# Patient Record
Sex: MALE | Race: WHITE | NOT HISPANIC OR LATINO
[De-identification: names, ages, dates, MRNs, and addresses within clinical notes are randomized per-mention and may not be internally consistent; named-entity substitution may affect disease eponyms.]

---

## 2018-03-26 PROBLEM — Z00.00 ENCOUNTER FOR PREVENTIVE HEALTH EXAMINATION: Status: ACTIVE | Noted: 2018-03-26

## 2018-03-27 ENCOUNTER — APPOINTMENT (OUTPATIENT)
Dept: SURGERY | Facility: CLINIC | Age: 28
End: 2018-03-27
Payer: MEDICAID

## 2018-03-27 VITALS
BODY MASS INDEX: 48.86 KG/M2 | DIASTOLIC BLOOD PRESSURE: 78 MMHG | SYSTOLIC BLOOD PRESSURE: 126 MMHG | HEIGHT: 67.5 IN | WEIGHT: 315 LBS

## 2018-03-27 PROCEDURE — 99204 OFFICE O/P NEW MOD 45 MIN: CPT

## 2018-03-27 PROCEDURE — SI006: CPT

## 2018-04-20 ENCOUNTER — APPOINTMENT (OUTPATIENT)
Dept: SURGERY | Facility: CLINIC | Age: 28
End: 2018-04-20
Payer: MEDICAID

## 2018-04-20 VITALS
WEIGHT: 315 LBS | BODY MASS INDEX: 48.86 KG/M2 | HEIGHT: 67.5 IN | SYSTOLIC BLOOD PRESSURE: 126 MMHG | DIASTOLIC BLOOD PRESSURE: 78 MMHG

## 2018-04-20 DIAGNOSIS — Z82.49 FAMILY HISTORY OF ISCHEMIC HEART DISEASE AND OTHER DISEASES OF THE CIRCULATORY SYSTEM: ICD-10-CM

## 2018-04-20 DIAGNOSIS — Z80.1 FAMILY HISTORY OF MALIGNANT NEOPLASM OF TRACHEA, BRONCHUS AND LUNG: ICD-10-CM

## 2018-04-20 PROCEDURE — 99215 OFFICE O/P EST HI 40 MIN: CPT

## 2018-05-03 ENCOUNTER — APPOINTMENT (OUTPATIENT)
Dept: SURGERY | Facility: CLINIC | Age: 28
End: 2018-05-03
Payer: MEDICAID

## 2018-05-03 VITALS — BODY MASS INDEX: 48.86 KG/M2 | WEIGHT: 315 LBS | HEIGHT: 67.5 IN

## 2018-05-03 PROCEDURE — 97802 MEDICAL NUTRITION INDIV IN: CPT

## 2018-06-07 ENCOUNTER — APPOINTMENT (OUTPATIENT)
Dept: SURGERY | Facility: CLINIC | Age: 28
End: 2018-06-07
Payer: MEDICAID

## 2018-06-07 VITALS — WEIGHT: 315 LBS | BODY MASS INDEX: 48.86 KG/M2 | HEIGHT: 67.5 IN

## 2018-06-07 PROCEDURE — 99212 OFFICE O/P EST SF 10 MIN: CPT

## 2018-07-20 ENCOUNTER — APPOINTMENT (OUTPATIENT)
Dept: SURGERY | Facility: CLINIC | Age: 28
End: 2018-07-20
Payer: MEDICAID

## 2018-07-20 VITALS — BODY MASS INDEX: 48.86 KG/M2 | HEIGHT: 67.5 IN | WEIGHT: 315 LBS

## 2018-07-20 PROCEDURE — 99212 OFFICE O/P EST SF 10 MIN: CPT

## 2018-07-30 ENCOUNTER — OUTPATIENT (OUTPATIENT)
Dept: OUTPATIENT SERVICES | Facility: HOSPITAL | Age: 28
LOS: 1 days | Discharge: HOME | End: 2018-07-30

## 2018-07-30 DIAGNOSIS — E66.01 MORBID (SEVERE) OBESITY DUE TO EXCESS CALORIES: ICD-10-CM

## 2018-07-31 ENCOUNTER — RESULT REVIEW (OUTPATIENT)
Age: 28
End: 2018-07-31

## 2018-08-06 ENCOUNTER — RX CHANGE (OUTPATIENT)
Age: 28
End: 2018-08-06

## 2018-08-08 RX ORDER — ERGOCALCIFEROL 1.25 MG/1
1.25 MG CAPSULE, LIQUID FILLED ORAL
Qty: 4 | Refills: 3 | Status: DISCONTINUED | COMMUNITY
Start: 2018-07-31 | End: 2018-08-08

## 2018-08-14 ENCOUNTER — OUTPATIENT (OUTPATIENT)
Dept: OUTPATIENT SERVICES | Facility: HOSPITAL | Age: 28
LOS: 1 days | Discharge: HOME | End: 2018-08-14

## 2018-08-14 ENCOUNTER — RESULT REVIEW (OUTPATIENT)
Age: 28
End: 2018-08-14

## 2018-08-14 VITALS — SYSTOLIC BLOOD PRESSURE: 164 MMHG | HEART RATE: 80 BPM | DIASTOLIC BLOOD PRESSURE: 74 MMHG | RESPIRATION RATE: 18 BRPM

## 2018-08-14 VITALS
HEART RATE: 100 BPM | RESPIRATION RATE: 20 BRPM | DIASTOLIC BLOOD PRESSURE: 102 MMHG | WEIGHT: 315 LBS | SYSTOLIC BLOOD PRESSURE: 144 MMHG | TEMPERATURE: 99 F | HEIGHT: 67 IN

## 2018-08-16 LAB — SURGICAL PATHOLOGY STUDY: SIGNIFICANT CHANGE UP

## 2018-08-20 DIAGNOSIS — K29.80 DUODENITIS WITHOUT BLEEDING: ICD-10-CM

## 2018-08-20 DIAGNOSIS — K29.00 ACUTE GASTRITIS WITHOUT BLEEDING: ICD-10-CM

## 2018-08-20 DIAGNOSIS — K21.9 GASTRO-ESOPHAGEAL REFLUX DISEASE WITHOUT ESOPHAGITIS: ICD-10-CM

## 2018-08-20 DIAGNOSIS — E66.9 OBESITY, UNSPECIFIED: ICD-10-CM

## 2018-08-22 PROBLEM — E66.9 OBESITY, UNSPECIFIED: Chronic | Status: ACTIVE | Noted: 2018-08-14

## 2018-08-27 ENCOUNTER — OUTPATIENT (OUTPATIENT)
Dept: OUTPATIENT SERVICES | Facility: HOSPITAL | Age: 28
LOS: 1 days | Discharge: HOME | End: 2018-08-27

## 2018-08-27 DIAGNOSIS — R10.10 UPPER ABDOMINAL PAIN, UNSPECIFIED: ICD-10-CM

## 2018-08-28 ENCOUNTER — APPOINTMENT (OUTPATIENT)
Dept: SURGERY | Facility: CLINIC | Age: 28
End: 2018-08-28
Payer: MEDICAID

## 2018-08-28 VITALS — WEIGHT: 315 LBS | BODY MASS INDEX: 48.86 KG/M2 | HEIGHT: 67.5 IN

## 2018-08-28 PROCEDURE — 99212 OFFICE O/P EST SF 10 MIN: CPT

## 2018-08-31 ENCOUNTER — APPOINTMENT (OUTPATIENT)
Dept: PULMONOLOGY | Facility: CLINIC | Age: 28
End: 2018-08-31

## 2018-08-31 ENCOUNTER — OUTPATIENT (OUTPATIENT)
Dept: OUTPATIENT SERVICES | Facility: HOSPITAL | Age: 28
LOS: 1 days | Discharge: HOME | End: 2018-08-31

## 2018-08-31 DIAGNOSIS — G47.33 OBSTRUCTIVE SLEEP APNEA (ADULT) (PEDIATRIC): ICD-10-CM

## 2018-09-04 ENCOUNTER — RESULT REVIEW (OUTPATIENT)
Age: 28
End: 2018-09-04

## 2018-09-04 ENCOUNTER — OTHER (OUTPATIENT)
Age: 28
End: 2018-09-04

## 2018-09-05 ENCOUNTER — OUTPATIENT (OUTPATIENT)
Dept: OUTPATIENT SERVICES | Facility: HOSPITAL | Age: 28
LOS: 1 days | Discharge: HOME | End: 2018-09-05

## 2018-09-05 ENCOUNTER — APPOINTMENT (OUTPATIENT)
Dept: CARDIOLOGY | Facility: CLINIC | Age: 28
End: 2018-09-05

## 2018-09-05 ENCOUNTER — NON-APPOINTMENT (OUTPATIENT)
Age: 28
End: 2018-09-05

## 2018-09-05 VITALS
HEART RATE: 83 BPM | HEIGHT: 67.5 IN | SYSTOLIC BLOOD PRESSURE: 128 MMHG | BODY MASS INDEX: 48.86 KG/M2 | DIASTOLIC BLOOD PRESSURE: 80 MMHG | WEIGHT: 315 LBS

## 2018-09-05 DIAGNOSIS — E66.1 DRUG-INDUCED OBESITY: ICD-10-CM

## 2018-09-05 DIAGNOSIS — R06.02 SHORTNESS OF BREATH: ICD-10-CM

## 2018-09-13 ENCOUNTER — OUTPATIENT (OUTPATIENT)
Dept: OUTPATIENT SERVICES | Facility: HOSPITAL | Age: 28
LOS: 1 days | Discharge: HOME | End: 2018-09-13

## 2018-09-13 DIAGNOSIS — E66.01 MORBID (SEVERE) OBESITY DUE TO EXCESS CALORIES: ICD-10-CM

## 2018-09-13 DIAGNOSIS — R06.02 SHORTNESS OF BREATH: ICD-10-CM

## 2018-09-13 DIAGNOSIS — Z01.818 ENCOUNTER FOR OTHER PREPROCEDURAL EXAMINATION: ICD-10-CM

## 2018-09-17 LAB
25(OH)D3 SERPL-MCNC: 15 NG/ML
CALCIUM SERPL-MCNC: 8.9 MG/DL
PARATHYROID HORMONE INTACT: 56 PG/ML

## 2018-09-25 ENCOUNTER — APPOINTMENT (OUTPATIENT)
Dept: SURGERY | Facility: CLINIC | Age: 28
End: 2018-09-25
Payer: MEDICAID

## 2018-09-25 VITALS — HEIGHT: 67.5 IN | WEIGHT: 315 LBS | BODY MASS INDEX: 48.86 KG/M2

## 2018-09-25 PROCEDURE — 99212 OFFICE O/P EST SF 10 MIN: CPT

## 2018-10-03 ENCOUNTER — APPOINTMENT (OUTPATIENT)
Dept: CARDIOLOGY | Facility: CLINIC | Age: 28
End: 2018-10-03

## 2018-10-03 VITALS
WEIGHT: 315 LBS | SYSTOLIC BLOOD PRESSURE: 132 MMHG | DIASTOLIC BLOOD PRESSURE: 80 MMHG | HEIGHT: 67.5 IN | OXYGEN SATURATION: 97 % | HEART RATE: 99 BPM | BODY MASS INDEX: 48.86 KG/M2

## 2018-10-15 ENCOUNTER — OUTPATIENT (OUTPATIENT)
Dept: OUTPATIENT SERVICES | Facility: HOSPITAL | Age: 28
LOS: 1 days | Discharge: HOME | End: 2018-10-15

## 2018-10-16 DIAGNOSIS — G47.33 OBSTRUCTIVE SLEEP APNEA (ADULT) (PEDIATRIC): ICD-10-CM

## 2018-10-23 ENCOUNTER — APPOINTMENT (OUTPATIENT)
Dept: SURGERY | Facility: CLINIC | Age: 28
End: 2018-10-23
Payer: MEDICAID

## 2018-10-23 VITALS — WEIGHT: 315 LBS | BODY MASS INDEX: 48.86 KG/M2 | HEIGHT: 67.5 IN

## 2018-10-23 PROCEDURE — 99212 OFFICE O/P EST SF 10 MIN: CPT

## 2018-11-02 ENCOUNTER — OUTPATIENT (OUTPATIENT)
Dept: OUTPATIENT SERVICES | Facility: HOSPITAL | Age: 28
LOS: 1 days | Discharge: HOME | End: 2018-11-02

## 2018-11-02 ENCOUNTER — APPOINTMENT (OUTPATIENT)
Dept: PULMONOLOGY | Facility: CLINIC | Age: 28
End: 2018-11-02

## 2018-11-02 VITALS
HEIGHT: 67.5 IN | WEIGHT: 315 LBS | BODY MASS INDEX: 48.86 KG/M2 | HEART RATE: 96 BPM | OXYGEN SATURATION: 96 % | DIASTOLIC BLOOD PRESSURE: 81 MMHG | SYSTOLIC BLOOD PRESSURE: 114 MMHG

## 2018-12-21 ENCOUNTER — APPOINTMENT (OUTPATIENT)
Dept: PULMONOLOGY | Facility: CLINIC | Age: 28
End: 2018-12-21

## 2018-12-21 ENCOUNTER — OUTPATIENT (OUTPATIENT)
Dept: OUTPATIENT SERVICES | Facility: HOSPITAL | Age: 28
LOS: 1 days | Discharge: HOME | End: 2018-12-21

## 2018-12-21 VITALS
HEART RATE: 102 BPM | WEIGHT: 315 LBS | DIASTOLIC BLOOD PRESSURE: 82 MMHG | HEIGHT: 67.5 IN | BODY MASS INDEX: 48.86 KG/M2 | OXYGEN SATURATION: 96 % | SYSTOLIC BLOOD PRESSURE: 122 MMHG

## 2018-12-21 NOTE — ASSESSMENT
[FreeTextEntry1] : 28 year old morbidly obese nonsmoking male with no past medical history or past surgical history presenting for initial visit for bariatric surgery clearance.\par \par #Moderate obstructive sleep apnea \par - Sleep study (novembe 2018): Pt should return to sleep lab for repeat nocturnal polysomnography with CPAP from the start\par - PFTs- WNL\par - CXR- WNL\par -Pt will need to bring his equipment to the hospital to use post-surgery\par -Follow up in 2 months\par \par

## 2018-12-21 NOTE — PHYSICAL EXAM
[No Acute Distress] : no acute distress [Well Nourished] : well nourished [Well Developed] : well developed [Well-Appearing] : well-appearing [Normal Sclera/Conjunctiva] : normal sclera/conjunctiva [PERRL] : pupils equal round and reactive to light [Normal Outer Ear/Nose] : the outer ears and nose were normal in appearance [Normal Oropharynx] : the oropharynx was normal [No JVD] : no jugular venous distention [Supple] : supple [No Respiratory Distress] : no respiratory distress  [Clear to Auscultation] : lungs were clear to auscultation bilaterally [No Accessory Muscle Use] : no accessory muscle use [Normal Rate] : normal rate  [Regular Rhythm] : with a regular rhythm [Normal S1, S2] : normal S1 and S2 [No Murmur] : no murmur heard [No Carotid Bruits] : no carotid bruits [No Extremity Clubbing/Cyanosis] : no extremity clubbing/cyanosis [Soft] : abdomen soft [Non Tender] : non-tender [Non-distended] : non-distended [No HSM] : no HSM [Normal Posterior Cervical Nodes] : no posterior cervical lymphadenopathy [Normal Anterior Cervical Nodes] : no anterior cervical lymphadenopathy [No CVA Tenderness] : no CVA  tenderness [No Spinal Tenderness] : no spinal tenderness [No Joint Swelling] : no joint swelling [Grossly Normal Strength/Tone] : grossly normal strength/tone [No Rash] : no rash [Normal Gait] : normal gait [Coordination Grossly Intact] : coordination grossly intact [No Focal Deficits] : no focal deficits [Deep Tendon Reflexes (DTR)] : deep tendon reflexes were 2+ and symmetric [Normal Affect] : the affect was normal [Normal Insight/Judgement] : insight and judgment were intact [de-identified] :  obese [de-identified] : Decreased breath sounds

## 2018-12-21 NOTE — HISTORY OF PRESENT ILLNESS
[FreeTextEntry1] : Follow up [de-identified] : 28 year old morbidly obese nonsmoking male with no past medical history or past surgical history presenting for follow up on a home sleep study he received. Pt is awaiting clearance before having his bariatric surgery.\par  He still reports snoring at night, waking up around 2-3 times a night, feels unrefreshed in the morning and complains of daytime sleepiness.\par Reports shortness of breath on exertion relieved when resting. Denies any coughing, fevers, chills, SOB, nausea, vomiting, diarrhea or constipation.\par

## 2018-12-21 NOTE — END OF VISIT
[Time Spent: ___ minutes] : I have spent [unfilled] minutes of face to face time with the patient [FreeTextEntry3] : camilla-bipap ordered/////acceptable risk for bariatric sx from a pulm perspective-needs to bring equipment to hosp and use it post-oppt has autopap not bipap

## 2019-02-11 ENCOUNTER — OUTPATIENT (OUTPATIENT)
Dept: OUTPATIENT SERVICES | Facility: HOSPITAL | Age: 29
LOS: 1 days | Discharge: HOME | End: 2019-02-11

## 2019-02-11 VITALS
TEMPERATURE: 98 F | DIASTOLIC BLOOD PRESSURE: 93 MMHG | RESPIRATION RATE: 17 BRPM | WEIGHT: 315 LBS | HEART RATE: 93 BPM | HEIGHT: 67 IN | OXYGEN SATURATION: 97 % | SYSTOLIC BLOOD PRESSURE: 136 MMHG

## 2019-02-11 DIAGNOSIS — Z98.890 OTHER SPECIFIED POSTPROCEDURAL STATES: Chronic | ICD-10-CM

## 2019-02-11 DIAGNOSIS — Z01.818 ENCOUNTER FOR OTHER PREPROCEDURAL EXAMINATION: ICD-10-CM

## 2019-02-11 DIAGNOSIS — E66.01 MORBID (SEVERE) OBESITY DUE TO EXCESS CALORIES: ICD-10-CM

## 2019-02-11 LAB
ALBUMIN SERPL ELPH-MCNC: 4.5 G/DL — SIGNIFICANT CHANGE UP (ref 3.5–5.2)
ALP SERPL-CCNC: 58 U/L — SIGNIFICANT CHANGE UP (ref 30–115)
ALT FLD-CCNC: 36 U/L — SIGNIFICANT CHANGE UP (ref 0–41)
ANION GAP SERPL CALC-SCNC: 21 MMOL/L — HIGH (ref 7–14)
APPEARANCE UR: CLEAR — SIGNIFICANT CHANGE UP
APTT BLD: 34.5 SEC — SIGNIFICANT CHANGE UP (ref 27–39.2)
AST SERPL-CCNC: 19 U/L — SIGNIFICANT CHANGE UP (ref 0–41)
BASOPHILS # BLD AUTO: 0.04 K/UL — SIGNIFICANT CHANGE UP (ref 0–0.2)
BASOPHILS NFR BLD AUTO: 0.6 % — SIGNIFICANT CHANGE UP (ref 0–1)
BILIRUB SERPL-MCNC: 1 MG/DL — SIGNIFICANT CHANGE UP (ref 0.2–1.2)
BILIRUB UR-MCNC: ABNORMAL
BLD GP AB SCN SERPL QL: SIGNIFICANT CHANGE UP
BUN SERPL-MCNC: 15 MG/DL — SIGNIFICANT CHANGE UP (ref 10–20)
CALCIUM SERPL-MCNC: 9.4 MG/DL — SIGNIFICANT CHANGE UP (ref 8.5–10.1)
CHLORIDE SERPL-SCNC: 97 MMOL/L — LOW (ref 98–110)
CO2 SERPL-SCNC: 22 MMOL/L — SIGNIFICANT CHANGE UP (ref 17–32)
COLOR SPEC: SIGNIFICANT CHANGE UP
CREAT SERPL-MCNC: 1 MG/DL — SIGNIFICANT CHANGE UP (ref 0.7–1.5)
DIFF PNL FLD: NEGATIVE — SIGNIFICANT CHANGE UP
EOSINOPHIL # BLD AUTO: 0.08 K/UL — SIGNIFICANT CHANGE UP (ref 0–0.7)
EOSINOPHIL NFR BLD AUTO: 1.2 % — SIGNIFICANT CHANGE UP (ref 0–8)
GLUCOSE SERPL-MCNC: 77 MG/DL — SIGNIFICANT CHANGE UP (ref 70–99)
GLUCOSE UR QL: NEGATIVE MG/DL — SIGNIFICANT CHANGE UP
HCT VFR BLD CALC: 46.1 % — SIGNIFICANT CHANGE UP (ref 42–52)
HGB BLD-MCNC: 15 G/DL — SIGNIFICANT CHANGE UP (ref 14–18)
IMM GRANULOCYTES NFR BLD AUTO: 0.1 % — SIGNIFICANT CHANGE UP (ref 0.1–0.3)
INR BLD: 1.14 RATIO — SIGNIFICANT CHANGE UP (ref 0.65–1.3)
KETONES UR-MCNC: 40
LEUKOCYTE ESTERASE UR-ACNC: NEGATIVE — SIGNIFICANT CHANGE UP
LYMPHOCYTES # BLD AUTO: 1.61 K/UL — SIGNIFICANT CHANGE UP (ref 1.2–3.4)
LYMPHOCYTES # BLD AUTO: 24 % — SIGNIFICANT CHANGE UP (ref 20.5–51.1)
MCHC RBC-ENTMCNC: 27.2 PG — SIGNIFICANT CHANGE UP (ref 27–31)
MCHC RBC-ENTMCNC: 32.5 G/DL — SIGNIFICANT CHANGE UP (ref 32–37)
MCV RBC AUTO: 83.7 FL — SIGNIFICANT CHANGE UP (ref 80–94)
MONOCYTES # BLD AUTO: 0.47 K/UL — SIGNIFICANT CHANGE UP (ref 0.1–0.6)
MONOCYTES NFR BLD AUTO: 7 % — SIGNIFICANT CHANGE UP (ref 1.7–9.3)
NEUTROPHILS # BLD AUTO: 4.5 K/UL — SIGNIFICANT CHANGE UP (ref 1.4–6.5)
NEUTROPHILS NFR BLD AUTO: 67.1 % — SIGNIFICANT CHANGE UP (ref 42.2–75.2)
NITRITE UR-MCNC: NEGATIVE — SIGNIFICANT CHANGE UP
NRBC # BLD: 0 /100 WBCS — SIGNIFICANT CHANGE UP (ref 0–0)
PH UR: 5.5 — SIGNIFICANT CHANGE UP (ref 5–8)
PLATELET # BLD AUTO: 234 K/UL — SIGNIFICANT CHANGE UP (ref 130–400)
POTASSIUM SERPL-MCNC: 4.2 MMOL/L — SIGNIFICANT CHANGE UP (ref 3.5–5)
POTASSIUM SERPL-SCNC: 4.2 MMOL/L — SIGNIFICANT CHANGE UP (ref 3.5–5)
PROT SERPL-MCNC: 7.7 G/DL — SIGNIFICANT CHANGE UP (ref 6–8)
PROT UR-MCNC: NEGATIVE MG/DL — SIGNIFICANT CHANGE UP
PROTHROM AB SERPL-ACNC: 13.1 SEC — HIGH (ref 9.95–12.87)
RBC # BLD: 5.51 M/UL — SIGNIFICANT CHANGE UP (ref 4.7–6.1)
RBC # FLD: 13.5 % — SIGNIFICANT CHANGE UP (ref 11.5–14.5)
SODIUM SERPL-SCNC: 140 MMOL/L — SIGNIFICANT CHANGE UP (ref 135–146)
SP GR SPEC: 1.02 — SIGNIFICANT CHANGE UP (ref 1.01–1.03)
TYPE + AB SCN PNL BLD: SIGNIFICANT CHANGE UP
UROBILINOGEN FLD QL: 1 MG/DL (ref 0.2–0.2)
WBC # BLD: 6.71 K/UL — SIGNIFICANT CHANGE UP (ref 4.8–10.8)
WBC # FLD AUTO: 6.71 K/UL — SIGNIFICANT CHANGE UP (ref 4.8–10.8)

## 2019-02-11 RX ORDER — ERGOCALCIFEROL 1.25 MG/1
0 CAPSULE ORAL
Qty: 0 | Refills: 0 | COMMUNITY

## 2019-02-11 NOTE — H&P PST ADULT - ADDITIONAL PE
MORBIDLY OBESE  ANXIOUS  APPROPRIATE/PLEASANT MORBIDLY OBESE  ANXIOUS  APPROPRIATE/PLEASANT  +1 le ankle edema noted

## 2019-02-11 NOTE — H&P PST ADULT - REASON FOR ADMISSION
Pt denies cp palp uri cough dysuria or sob. ET 1 FOS WITH  SOB AT TIMES . PT denies any open wounds, drainage or rashes.   scheduled for 2/25/19 lap gastric bypass possible open possible INTRAOPERATIVE  endoscopy.  ALL CLEARANCES ON CHART-PT HAS APPT TODAY FOR PMD [facility] .  PT ADVISED TO FOLLOW UP TODAY REGARDING ANY OMEPRAZOLE NEEDED-PT STATES" I RAN OUT. NOT SURE IF I NEED THEM " PT COMPLIANT AND WILL F/U DR DARBY OFFICE.  PRIOR CXR ON CHART 9/2018. PT DENIES ANY RECENT URI COUGHS FEVER

## 2019-02-11 NOTE — H&P PST ADULT - PMH
Anxiety    Depression    Obesity    ANTOLIN on CPAP Anxiety    Depression    Obesity    ANTOLIN on CPAP    Tinnitus

## 2019-02-13 ENCOUNTER — OTHER (OUTPATIENT)
Age: 29
End: 2019-02-13

## 2019-02-20 ENCOUNTER — APPOINTMENT (OUTPATIENT)
Dept: SURGERY | Facility: CLINIC | Age: 29
End: 2019-02-20
Payer: MEDICAID

## 2019-02-20 VITALS
DIASTOLIC BLOOD PRESSURE: 76 MMHG | HEIGHT: 67.5 IN | WEIGHT: 315 LBS | SYSTOLIC BLOOD PRESSURE: 122 MMHG | BODY MASS INDEX: 48.86 KG/M2

## 2019-02-20 DIAGNOSIS — Z01.818 ENCOUNTER FOR OTHER PREPROCEDURAL EXAMINATION: ICD-10-CM

## 2019-02-20 DIAGNOSIS — Z01.810 ENCOUNTER FOR PREPROCEDURAL CARDIOVASCULAR EXAMINATION: ICD-10-CM

## 2019-02-20 PROCEDURE — 99215 OFFICE O/P EST HI 40 MIN: CPT

## 2019-02-20 RX ORDER — OMEPRAZOLE 40 MG/1
40 CAPSULE, DELAYED RELEASE ORAL
Refills: 3 | Status: COMPLETED | COMMUNITY
End: 2019-02-20

## 2019-02-22 ENCOUNTER — OTHER (OUTPATIENT)
Age: 29
End: 2019-02-22

## 2019-02-22 NOTE — ASSESSMENT
[FreeTextEntry1] : GABY CERDA is a 28 year male seen today for Preoperative Bariatric visit. All medications were reviewed with the patient and instructions were given in respect to medications on the day of surgery.  Written instructions provided.\par \par

## 2019-02-22 NOTE — HISTORY OF PRESENT ILLNESS
[de-identified] : He is scheduled for Laparoscopic Gastric Bypass on 2/25/2019. The patient states that he has been overweight for several years and has tried multiple weight loss modalities in the past without any long-term sustainable weight loss. \par

## 2019-02-22 NOTE — REASON FOR VISIT
[Follow-Up Visit] : a follow-up visit for [Morbid Obesity (BMI>40)] : morbid obesity (bmi>40) [FreeTextEntry2] : Patient presents for Preoperative Bariatric visit.

## 2019-02-25 ENCOUNTER — APPOINTMENT (OUTPATIENT)
Dept: SURGERY | Facility: HOSPITAL | Age: 29
End: 2019-02-25

## 2019-02-25 PROBLEM — H93.19 TINNITUS, UNSPECIFIED EAR: Chronic | Status: ACTIVE | Noted: 2019-02-11

## 2019-02-25 PROBLEM — F32.9 MAJOR DEPRESSIVE DISORDER, SINGLE EPISODE, UNSPECIFIED: Chronic | Status: ACTIVE | Noted: 2019-02-11

## 2019-02-25 PROBLEM — G47.33 OBSTRUCTIVE SLEEP APNEA (ADULT) (PEDIATRIC): Chronic | Status: ACTIVE | Noted: 2019-02-11

## 2019-02-25 PROBLEM — F41.9 ANXIETY DISORDER, UNSPECIFIED: Chronic | Status: ACTIVE | Noted: 2019-02-11

## 2019-03-01 ENCOUNTER — OUTPATIENT (OUTPATIENT)
Dept: OUTPATIENT SERVICES | Facility: HOSPITAL | Age: 29
LOS: 1 days | End: 2019-03-01
Payer: MEDICAID

## 2019-03-01 DIAGNOSIS — Z98.890 OTHER SPECIFIED POSTPROCEDURAL STATES: Chronic | ICD-10-CM

## 2019-03-01 PROCEDURE — G9001: CPT

## 2019-03-06 ENCOUNTER — APPOINTMENT (OUTPATIENT)
Dept: SURGERY | Facility: CLINIC | Age: 29
End: 2019-03-06

## 2019-03-11 ENCOUNTER — APPOINTMENT (OUTPATIENT)
Dept: SURGERY | Facility: HOSPITAL | Age: 29
End: 2019-03-11
Payer: MEDICAID

## 2019-03-11 ENCOUNTER — INPATIENT (INPATIENT)
Facility: HOSPITAL | Age: 29
LOS: 1 days | Discharge: HOME | End: 2019-03-13
Attending: SURGERY | Admitting: SURGERY
Payer: MEDICAID

## 2019-03-11 VITALS
SYSTOLIC BLOOD PRESSURE: 139 MMHG | DIASTOLIC BLOOD PRESSURE: 65 MMHG | WEIGHT: 315 LBS | HEART RATE: 121 BPM | TEMPERATURE: 98 F | RESPIRATION RATE: 16 BRPM | HEIGHT: 67 IN

## 2019-03-11 DIAGNOSIS — Z98.890 OTHER SPECIFIED POSTPROCEDURAL STATES: Chronic | ICD-10-CM

## 2019-03-11 LAB
ABO RH CONFIRMATION: SIGNIFICANT CHANGE UP
GLUCOSE BLDC GLUCOMTR-MCNC: 102 MG/DL — HIGH (ref 70–99)
GLUCOSE BLDC GLUCOMTR-MCNC: 110 MG/DL — HIGH (ref 70–99)
GLUCOSE BLDC GLUCOMTR-MCNC: 143 MG/DL — HIGH (ref 70–99)

## 2019-03-11 PROCEDURE — 43644 LAP GASTRIC BYPASS/ROUX-EN-Y: CPT | Mod: 82

## 2019-03-11 PROCEDURE — 43644 LAP GASTRIC BYPASS/ROUX-EN-Y: CPT

## 2019-03-11 RX ORDER — SODIUM CHLORIDE 9 MG/ML
1000 INJECTION, SOLUTION INTRAVENOUS
Qty: 0 | Refills: 0 | Status: DISCONTINUED | OUTPATIENT
Start: 2019-03-11 | End: 2019-03-12

## 2019-03-11 RX ORDER — ONDANSETRON 8 MG/1
4 TABLET, FILM COATED ORAL EVERY 8 HOURS
Qty: 0 | Refills: 0 | Status: DISCONTINUED | OUTPATIENT
Start: 2019-03-11 | End: 2019-03-13

## 2019-03-11 RX ORDER — BUPIVACAINE 13.3 MG/ML
20 INJECTION, SUSPENSION, LIPOSOMAL INFILTRATION ONCE
Qty: 0 | Refills: 0 | Status: DISCONTINUED | OUTPATIENT
Start: 2019-03-11 | End: 2019-03-11

## 2019-03-11 RX ORDER — OXYCODONE AND ACETAMINOPHEN 5; 325 MG/1; MG/1
1 TABLET ORAL ONCE
Qty: 0 | Refills: 0 | Status: DISCONTINUED | OUTPATIENT
Start: 2019-03-11 | End: 2019-03-11

## 2019-03-11 RX ORDER — HYDROMORPHONE HYDROCHLORIDE 2 MG/ML
0.5 INJECTION INTRAMUSCULAR; INTRAVENOUS; SUBCUTANEOUS
Qty: 0 | Refills: 0 | Status: DISCONTINUED | OUTPATIENT
Start: 2019-03-11 | End: 2019-03-11

## 2019-03-11 RX ORDER — HEPARIN SODIUM 5000 [USP'U]/ML
5000 INJECTION INTRAVENOUS; SUBCUTANEOUS ONCE
Qty: 0 | Refills: 0 | Status: COMPLETED | OUTPATIENT
Start: 2019-03-11 | End: 2019-03-11

## 2019-03-11 RX ORDER — PANTOPRAZOLE SODIUM 20 MG/1
40 TABLET, DELAYED RELEASE ORAL EVERY 24 HOURS
Qty: 0 | Refills: 0 | Status: DISCONTINUED | OUTPATIENT
Start: 2019-03-11 | End: 2019-03-13

## 2019-03-11 RX ORDER — MORPHINE SULFATE 50 MG/1
30 CAPSULE, EXTENDED RELEASE ORAL
Qty: 0 | Refills: 0 | Status: DISCONTINUED | OUTPATIENT
Start: 2019-03-11 | End: 2019-03-12

## 2019-03-11 RX ORDER — ASPIRIN/CALCIUM CARB/MAGNESIUM 324 MG
0 TABLET ORAL
Qty: 0 | Refills: 0 | COMMUNITY

## 2019-03-11 RX ORDER — HEPARIN SODIUM 5000 [USP'U]/ML
7500 INJECTION INTRAVENOUS; SUBCUTANEOUS EVERY 8 HOURS
Qty: 0 | Refills: 0 | Status: DISCONTINUED | OUTPATIENT
Start: 2019-03-11 | End: 2019-03-13

## 2019-03-11 RX ORDER — ONDANSETRON 8 MG/1
4 TABLET, FILM COATED ORAL EVERY 6 HOURS
Qty: 0 | Refills: 0 | Status: DISCONTINUED | OUTPATIENT
Start: 2019-03-11 | End: 2019-03-13

## 2019-03-11 RX ORDER — HYDROMORPHONE HYDROCHLORIDE 2 MG/ML
1 INJECTION INTRAMUSCULAR; INTRAVENOUS; SUBCUTANEOUS
Qty: 0 | Refills: 0 | Status: DISCONTINUED | OUTPATIENT
Start: 2019-03-11 | End: 2019-03-12

## 2019-03-11 RX ORDER — HYDROMORPHONE HYDROCHLORIDE 2 MG/ML
1 INJECTION INTRAMUSCULAR; INTRAVENOUS; SUBCUTANEOUS
Qty: 0 | Refills: 0 | Status: DISCONTINUED | OUTPATIENT
Start: 2019-03-11 | End: 2019-03-11

## 2019-03-11 RX ORDER — CHOLECALCIFEROL (VITAMIN D3) 125 MCG
1 CAPSULE ORAL
Qty: 0 | Refills: 0 | COMMUNITY

## 2019-03-11 RX ORDER — NALOXONE HYDROCHLORIDE 4 MG/.1ML
0.1 SPRAY NASAL
Qty: 0 | Refills: 0 | Status: DISCONTINUED | OUTPATIENT
Start: 2019-03-11 | End: 2019-03-13

## 2019-03-11 RX ORDER — SODIUM CHLORIDE 9 MG/ML
1000 INJECTION, SOLUTION INTRAVENOUS
Qty: 0 | Refills: 0 | Status: DISCONTINUED | OUTPATIENT
Start: 2019-03-11 | End: 2019-03-11

## 2019-03-11 RX ORDER — ACETAMINOPHEN 500 MG
1000 TABLET ORAL ONCE
Qty: 0 | Refills: 0 | Status: DISCONTINUED | OUTPATIENT
Start: 2019-03-11 | End: 2019-03-11

## 2019-03-11 RX ORDER — CEFOTETAN DISODIUM 1 G
2 VIAL (EA) INJECTION EVERY 12 HOURS
Qty: 0 | Refills: 0 | Status: DISCONTINUED | OUTPATIENT
Start: 2019-03-11 | End: 2019-03-13

## 2019-03-11 RX ORDER — ONDANSETRON 8 MG/1
4 TABLET, FILM COATED ORAL EVERY 4 HOURS
Qty: 0 | Refills: 0 | Status: DISCONTINUED | OUTPATIENT
Start: 2019-03-11 | End: 2019-03-12

## 2019-03-11 RX ADMIN — HYDROMORPHONE HYDROCHLORIDE 1 MILLIGRAM(S): 2 INJECTION INTRAMUSCULAR; INTRAVENOUS; SUBCUTANEOUS at 12:12

## 2019-03-11 RX ADMIN — HYDROMORPHONE HYDROCHLORIDE 0.5 MILLIGRAM(S): 2 INJECTION INTRAMUSCULAR; INTRAVENOUS; SUBCUTANEOUS at 12:59

## 2019-03-11 RX ADMIN — ONDANSETRON 4 MILLIGRAM(S): 8 TABLET, FILM COATED ORAL at 14:15

## 2019-03-11 RX ADMIN — HEPARIN SODIUM 5000 UNIT(S): 5000 INJECTION INTRAVENOUS; SUBCUTANEOUS at 07:22

## 2019-03-11 RX ADMIN — HYDROMORPHONE HYDROCHLORIDE 1 MILLIGRAM(S): 2 INJECTION INTRAMUSCULAR; INTRAVENOUS; SUBCUTANEOUS at 20:12

## 2019-03-11 RX ADMIN — HYDROMORPHONE HYDROCHLORIDE 0.5 MILLIGRAM(S): 2 INJECTION INTRAMUSCULAR; INTRAVENOUS; SUBCUTANEOUS at 11:49

## 2019-03-11 RX ADMIN — HYDROMORPHONE HYDROCHLORIDE 0.5 MILLIGRAM(S): 2 INJECTION INTRAMUSCULAR; INTRAVENOUS; SUBCUTANEOUS at 13:50

## 2019-03-11 RX ADMIN — HYDROMORPHONE HYDROCHLORIDE 1 MILLIGRAM(S): 2 INJECTION INTRAMUSCULAR; INTRAVENOUS; SUBCUTANEOUS at 20:32

## 2019-03-11 RX ADMIN — HYDROMORPHONE HYDROCHLORIDE 0.5 MILLIGRAM(S): 2 INJECTION INTRAMUSCULAR; INTRAVENOUS; SUBCUTANEOUS at 13:30

## 2019-03-11 RX ADMIN — HYDROMORPHONE HYDROCHLORIDE 1 MILLIGRAM(S): 2 INJECTION INTRAMUSCULAR; INTRAVENOUS; SUBCUTANEOUS at 12:30

## 2019-03-11 RX ADMIN — PANTOPRAZOLE SODIUM 40 MILLIGRAM(S): 20 TABLET, DELAYED RELEASE ORAL at 13:33

## 2019-03-11 RX ADMIN — HYDROMORPHONE HYDROCHLORIDE 0.5 MILLIGRAM(S): 2 INJECTION INTRAMUSCULAR; INTRAVENOUS; SUBCUTANEOUS at 11:58

## 2019-03-11 RX ADMIN — HYDROMORPHONE HYDROCHLORIDE 1 MILLIGRAM(S): 2 INJECTION INTRAMUSCULAR; INTRAVENOUS; SUBCUTANEOUS at 12:18

## 2019-03-11 RX ADMIN — HYDROMORPHONE HYDROCHLORIDE 0.5 MILLIGRAM(S): 2 INJECTION INTRAMUSCULAR; INTRAVENOUS; SUBCUTANEOUS at 14:10

## 2019-03-11 RX ADMIN — HYDROMORPHONE HYDROCHLORIDE 0.5 MILLIGRAM(S): 2 INJECTION INTRAMUSCULAR; INTRAVENOUS; SUBCUTANEOUS at 14:53

## 2019-03-11 RX ADMIN — HYDROMORPHONE HYDROCHLORIDE 0.5 MILLIGRAM(S): 2 INJECTION INTRAMUSCULAR; INTRAVENOUS; SUBCUTANEOUS at 14:12

## 2019-03-11 RX ADMIN — HEPARIN SODIUM 7500 UNIT(S): 5000 INJECTION INTRAVENOUS; SUBCUTANEOUS at 17:56

## 2019-03-11 RX ADMIN — HYDROMORPHONE HYDROCHLORIDE 1 MILLIGRAM(S): 2 INJECTION INTRAMUSCULAR; INTRAVENOUS; SUBCUTANEOUS at 12:00

## 2019-03-11 RX ADMIN — SODIUM CHLORIDE 150 MILLILITER(S): 9 INJECTION, SOLUTION INTRAVENOUS at 11:40

## 2019-03-11 RX ADMIN — ONDANSETRON 4 MILLIGRAM(S): 8 TABLET, FILM COATED ORAL at 22:14

## 2019-03-11 RX ADMIN — Medication 100 GRAM(S): at 19:22

## 2019-03-11 RX ADMIN — MORPHINE SULFATE 30 MILLILITER(S): 50 CAPSULE, EXTENDED RELEASE ORAL at 15:00

## 2019-03-11 RX ADMIN — SODIUM CHLORIDE 125 MILLILITER(S): 9 INJECTION, SOLUTION INTRAVENOUS at 13:00

## 2019-03-11 NOTE — CHART NOTE - NSCHARTNOTEFT_GEN_A_CORE
PACU note      Procedure: Laparoscopic gastric bypass and Linn-en-Y gastroenterostomy with Linn limb 150 cm or less    Post op diagnosis:  Morbid obesity due to excess calories      ____  Intubated  TV:______       Rate: ______      FiO2: ______    _x___  Patent Airway    _x___  Full return of protective reflexes    _x___  Full recovery from anesthesia / back to baseline status    Vitals:  T(C): 37.2 (03-11-19 @ 20:00), Max: 37.2 (03-11-19 @ 16:00)  HR: 96 (03-11-19 @ 20:00) (90 - 121)  BP: 151/81 (03-11-19 @ 20:00) (116/65 - 175/95)  RR: 22 (03-11-19 @ 20:00) (14 - 25)  SpO2: 97% (03-11-19 @ 20:00) (93% - 100%)    Mental Status:  _x___ Awake   _____ Alert   _____ Drowsy   _____ Sedated    Nausea/Vomiting:  _x___  NO       ______Yes,   See Post - Op Orders         Pain Scale (0-10):  6__    Treatment: ___ None    ___ x_ See Post - Op/PCA Orders    Post - Operative Fluids:   __x__ Oral   ____ See Post - Op Orders    Plan: Discharge:   ____Home       _____Floor     ___x __Critical Care    _____  Other:_________________    Comments:  pt reporting pain score in the range of 4-6 will continue PCA morphine and will add two doses of dilaudid for breakthrough pain and will follow up

## 2019-03-11 NOTE — CONSULT NOTE ADULT - SUBJECTIVE AND OBJECTIVE BOX
SICU Consultation Note  ======================================================================================================  28y Male  pmh morbid obesity (BMI 54), ANTOLIN on CPAP, chronic back pain, anxiety. Patient presents today for electric bariatric surgery with Dr. Kruse. Patient is currently s/p lap anish-en-y gastric bypass, extubated and resting in PACU. The procedure was uneventful with no introp events.      Tinnitus  ANTOLIN on CPAP  Depression  Anxiety  Obesity    History of esophagogastroduodenoscopy (EGD)      OR time:   3hours   EBL: 5cc         IV Fluids:1300 IVF       Blood Products:      None         UOP:    150cc with levi    PAST MEDICAL & SURGICAL HISTORY:  Tinnitus  ANTOLIN on CPAP  Depression  Anxiety  Obesity  History of esophagogastroduodenoscopy (EGD)      Home Meds: Home Medications:  aspirin 325 mg oral tablet: prn headache - at timesadvised hold any ASA at this time- (11 Mar 2019 07:47)  Vitamin D3 2000 intl units oral capsule: 1 cap(s) orally once a day (11 Mar 2019 07:47)    Allergies: No Known Allergies    Advanced Directives: Full Code      CURRENT MEDICATIONS:   --------------------------------------------------------------------------------------  Neurologic Medications  HYDROmorphone  Injectable 0.5 milliGRAM(s) IV Push every 10 minutes PRN Moderate Pain (4 - 6)  HYDROmorphone  Injectable 1 milliGRAM(s) IV Push every 10 minutes PRN Severe Pain (7 - 10)  ondansetron Injectable 4 milliGRAM(s) IV Push every 8 hours  ondansetron Injectable 4 milliGRAM(s) IV Push every 4 hours PRN Nausea and/or Vomiting  oxyCODONE    5 mG/acetaminophen 325 mG 1 Tablet(s) Oral once PRN Moderate Pain (4 - 6)    Respiratory Medications    Cardiovascular Medications    Gastrointestinal Medications  lactated ringers. 1000 milliLiter(s) IV Continuous <Continuous>  pantoprazole  Injectable 40 milliGRAM(s) IV Push every 24 hours    Genitourinary Medications    Hematologic/Oncologic Medications  heparin  Injectable 7500 Unit(s) SubCutaneous every 8 hours    Antimicrobial/Immunologic Medications  cefoTEtan  IVPB 2 Gram(s) IV Intermittent every 12 hours    Endocrine/Metabolic Medications    Topical/Other Medications          VITAL SIGNS, INS/OUTS (last 24 hours):    ICU Vital Signs Last 24 Hrs  T(C): 36.6 (11 Mar 2019 11:40), Max: 36.8 (11 Mar 2019 06:26)  T(F): 97.9 (11 Mar 2019 11:40), Max: 98.2 (11 Mar 2019 06:26)  HR: 94 (11 Mar 2019 13:00) (90 - 121)  BP: 134/68 (11 Mar 2019 13:00) (134/68 - 175/95)  BP(mean): 89 (11 Mar 2019 13:00) (89 - 124)  RR: 22 (11 Mar 2019 13:00) (16 - 25)  SpO2: 97% (11 Mar 2019 13:00) (93% - 100%)    I&O's Summary    11 Mar 2019 07:01  -  11 Mar 2019 13:27  --------------------------------------------------------  IN: 180 mL / OUT: 150 mL / NET: 30 mL          Height (cm): 170.18 (03-11-19)  Weight (kg): 163.3 (03-11-19)  BMI (kg/m2): 56.4 (03-11-19)  BSA (m2): 2.6 (03-11-19)    Physical Exam:  ---------------------------------------------------------------------------------------  GCS: 15  Exam: A&Ox3, no focal deficits    RESPIRATORY:  Nasacl cannula    CARDIOVASCULAR:   S1/S2. Sinus tachy    GASTROINTESTINAL:  Abdomen soft, non-tender, non-distended, no wounds or discoloration    MUSCULOSKELETAL:  Extremities warm, pink, well-perfused.  b/l radial pulses, palpable    DERM:  No skin breakdown     :   Exam: Levi catheter in place.       Tubes/Lines/Drains   ----------------------------------------------------------------------------------------------------------  [x] Peripheral IV  [X] Urinary Catheter Levi         Yes                     Date Placed: 03-11      LABS  --------------------------------------------------------------------------------------  2330 Labs      CAPILLARY BLOOD GLUCOSE      POCT Blood Glucose.: 102 mg/dL (11 Mar 2019 06:07)        CT/XRAY/ECHO/TCD/EEG  ----------------------------------------------------------------------------------------------  AM CXR      --------------------------------------------------------------------------------------  Admit Diagnosis: elective bariatric surgery      Critical Care Diagnoses: morbid SICU Consultation Note  ======================================================================================================  28y Male  pmh morbid obesity (BMI 54), ANTOLIN on CPAP, chronic back pain, anxiety. Patient presents today for electric bariatric surgery with Dr. Kruse. Patient is currently s/p lap anish-en-y gastric bypass, extubated and resting in PACU. The procedure was uneventful with no introp events.    OR time:   3hours   EBL: 5cc         IV Fluids:1300 IVF       Blood Products:      None         UOP:    150cc with amita    PAST MEDICAL & SURGICAL HISTORY:  Tinnitus  ANTOLIN on CPAP  Depression  Anxiety  Obesity  History of esophagogastroduodenoscopy (EGD)      Home Meds: Home Medications:  aspirin 325 mg oral tablet: prn headache - at timesadvised hold any ASA at this time- (11 Mar 2019 07:47)  Vitamin D3 2000 intl units oral capsule: 1 cap(s) orally once a day (11 Mar 2019 07:47)    Allergies: No Known Allergies    Advanced Directives: Full Code      CURRENT MEDICATIONS:   --------------------------------------------------------------------------------------  Neurologic Medications  HYDROmorphone  Injectable 0.5 milliGRAM(s) IV Push every 10 minutes PRN Moderate Pain (4 - 6)  HYDROmorphone  Injectable 1 milliGRAM(s) IV Push every 10 minutes PRN Severe Pain (7 - 10)  ondansetron Injectable 4 milliGRAM(s) IV Push every 8 hours  ondansetron Injectable 4 milliGRAM(s) IV Push every 4 hours PRN Nausea and/or Vomiting  oxyCODONE    5 mG/acetaminophen 325 mG 1 Tablet(s) Oral once PRN Moderate Pain (4 - 6)    Respiratory Medications    Cardiovascular Medications    Gastrointestinal Medications  lactated ringers. 1000 milliLiter(s) IV Continuous <Continuous>  pantoprazole  Injectable 40 milliGRAM(s) IV Push every 24 hours    Genitourinary Medications    Hematologic/Oncologic Medications  heparin  Injectable 7500 Unit(s) SubCutaneous every 8 hours    Antimicrobial/Immunologic Medications  cefoTEtan  IVPB 2 Gram(s) IV Intermittent every 12 hours    Endocrine/Metabolic Medications    Topical/Other Medications      VITAL SIGNS, INS/OUTS (last 24 hours):    ICU Vital Signs Last 24 Hrs  T(C): 36.6 (11 Mar 2019 11:40), Max: 36.8 (11 Mar 2019 06:26)  T(F): 97.9 (11 Mar 2019 11:40), Max: 98.2 (11 Mar 2019 06:26)  HR: 94 (11 Mar 2019 13:00) (90 - 121)  BP: 134/68 (11 Mar 2019 13:00) (134/68 - 175/95)  BP(mean): 89 (11 Mar 2019 13:00) (89 - 124)  RR: 22 (11 Mar 2019 13:00) (16 - 25)  SpO2: 97% (11 Mar 2019 13:00) (93% - 100%)    I&O's Summary    11 Mar 2019 07:01  -  11 Mar 2019 13:27  --------------------------------------------------------  IN: 180 mL / OUT: 150 mL / NET: 30 mL          Height (cm): 170.18 (03-11-19)  Weight (kg): 163.3 (03-11-19)  BMI (kg/m2): 56.4 (03-11-19)  BSA (m2): 2.6 (03-11-19)    Physical Exam:  ---------------------------------------------------------------------------------------  GCS: 15  Exam: A&Ox3, no focal deficits    RESPIRATORY:  Nasacl cannula    CARDIOVASCULAR:   S1/S2. Sinus tachy    GASTROINTESTINAL:  Abdomen soft, non-tender, non-distended  -5 port incision sites    MUSCULOSKELETAL:  Extremities warm, pink, well-perfused.  b/l radial pulses, palpable    :   Exam: Hedrick catheter in place.       Tubes/Lines/Drains   ----------------------------------------------------------------------------------------------------------  [x] Peripheral IV  [X] Urinary Catheter Hedrick         Yes                     Date Placed: 03-11      LABS  --------------------------------------------------------------------------------------  2330 Labs      CAPILLARY BLOOD GLUCOSE      POCT Blood Glucose.: 102 mg/dL (11 Mar 2019 06:07)        CT/XRAY/ECHO/TCD/EEG  ----------------------------------------------------------------------------------------------  AM CXR      --------------------------------------------------------------------------------------  Admit Diagnosis: elective bariatric surgery      Critical Care Diagnoses: morbid SICU Consultation Note  ======================================================================================================  28y Male  pmh morbid obesity (BMI 54), ANTOLIN on CPAP, chronic back pain, anxiety. Patient presents today for electric bariatric surgery with Dr. Kruse. Patient is currently s/p lap anish-en-y gastric bypass, extubated and resting in PACU. The procedure was uneventful with no introp events. In PACU patient complains of pain for which he received dilaudid and will be started on Morphine PCA    OR time:   3hours   EBL: 5cc         IV Fluids:1300 IVF       Blood Products:      None         UOP:    150cc with levi    PAST MEDICAL & SURGICAL HISTORY:  Tinnitus  ANTOLIN on CPAP  Depression  Anxiety  Obesity  History of esophagogastroduodenoscopy (EGD)      Home Meds: Home Medications:  aspirin 325 mg oral tablet: prn headache - at timesadvised hold any ASA at this time- (11 Mar 2019 07:47)  Vitamin D3 2000 intl units oral capsule: 1 cap(s) orally once a day (11 Mar 2019 07:47)    Allergies: No Known Allergies    Advanced Directives: Full Code      CURRENT MEDICATIONS:   --------------------------------------------------------------------------------------  Neurologic Medications  HYDROmorphone  Injectable 0.5 milliGRAM(s) IV Push every 10 minutes PRN Moderate Pain (4 - 6)  HYDROmorphone  Injectable 1 milliGRAM(s) IV Push every 10 minutes PRN Severe Pain (7 - 10)  ondansetron Injectable 4 milliGRAM(s) IV Push every 8 hours  ondansetron Injectable 4 milliGRAM(s) IV Push every 4 hours PRN Nausea and/or Vomiting  oxyCODONE    5 mG/acetaminophen 325 mG 1 Tablet(s) Oral once PRN Moderate Pain (4 - 6)    Respiratory Medications    Cardiovascular Medications    Gastrointestinal Medications  lactated ringers. 1000 milliLiter(s) IV Continuous <Continuous>  pantoprazole  Injectable 40 milliGRAM(s) IV Push every 24 hours    Genitourinary Medications    Hematologic/Oncologic Medications  heparin  Injectable 7500 Unit(s) SubCutaneous every 8 hours    Antimicrobial/Immunologic Medications  cefoTEtan  IVPB 2 Gram(s) IV Intermittent every 12 hours    Endocrine/Metabolic Medications    Topical/Other Medications      VITAL SIGNS, INS/OUTS (last 24 hours):    ICU Vital Signs Last 24 Hrs  T(C): 36.6 (11 Mar 2019 11:40), Max: 36.8 (11 Mar 2019 06:26)  T(F): 97.9 (11 Mar 2019 11:40), Max: 98.2 (11 Mar 2019 06:26)  HR: 94 (11 Mar 2019 13:00) (90 - 121)  BP: 134/68 (11 Mar 2019 13:00) (134/68 - 175/95)  BP(mean): 89 (11 Mar 2019 13:00) (89 - 124)  RR: 22 (11 Mar 2019 13:00) (16 - 25)  SpO2: 97% (11 Mar 2019 13:00) (93% - 100%)    I&O's Summary    11 Mar 2019 07:01  -  11 Mar 2019 13:27  --------------------------------------------------------  IN: 180 mL / OUT: 150 mL / NET: 30 mL          Height (cm): 170.18 (03-11-19)  Weight (kg): 163.3 (03-11-19)  BMI (kg/m2): 56.4 (03-11-19)  BSA (m2): 2.6 (03-11-19)    Physical Exam:  ---------------------------------------------------------------------------------------  GCS: 15  Exam: A&Ox3, no focal deficits    RESPIRATORY:  Nasacl cannula    CARDIOVASCULAR:   S1/S2. Sinus tachy    GASTROINTESTINAL:  Abdomen soft, non-tender, non-distended  -5 port incision sites    MUSCULOSKELETAL:  Extremities warm, pink, well-perfused.  b/l radial pulses, palpable    :   Exam: Levi catheter in place.       Tubes/Lines/Drains   ----------------------------------------------------------------------------------------------------------  [x] Peripheral IV  [X] Urinary Catheter Levi         Yes                     Date Placed: 03-11      LABS  --------------------------------------------------------------------------------------  2330 Labs      CAPILLARY BLOOD GLUCOSE      POCT Blood Glucose.: 102 mg/dL (11 Mar 2019 06:07)        CT/XRAY/ECHO/TCD/EEG  ----------------------------------------------------------------------------------------------  AM CXR      --------------------------------------------------------------------------------------  Admit Diagnosis: elective bariatric surgery      Critical Care Diagnoses: morbid SICU Consultation Note  ======================================================================================================  28y Male  pmh morbid obesity (BMI 54), ANTOLIN on CPAP, chronic back pain, anxiety. Patient presents today for elective bariatric surgery with Dr. Kruse. Patient is currently s/p lap anish-en-y gastric bypass, extubated and resting in PACU. The procedure was uneventful with no introp events. In PACU patient complains of pain for which he received dilaudid and will be started on Morphine PCA      OR time:   3hours   EBL: 5cc         IV Fluids:1300 IVF       Blood Products:      None         UOP:    150cc with levi    PAST MEDICAL & SURGICAL HISTORY:  Tinnitus  ANTOLIN on CPAP  Depression  Anxiety  Obesity  History of esophagogastroduodenoscopy (EGD)      Home Meds: Home Medications:  aspirin 325 mg oral tablet: prn headache - at timesadvised hold any ASA at this time- (11 Mar 2019 07:47)  Vitamin D3 2000 intl units oral capsule: 1 cap(s) orally once a day (11 Mar 2019 07:47)    Allergies: No Known Allergies    Advanced Directives: Full Code      CURRENT MEDICATIONS:   --------------------------------------------------------------------------------------  Neurologic Medications  HYDROmorphone  Injectable 0.5 milliGRAM(s) IV Push every 10 minutes PRN Moderate Pain (4 - 6)  HYDROmorphone  Injectable 1 milliGRAM(s) IV Push every 10 minutes PRN Severe Pain (7 - 10)  ondansetron Injectable 4 milliGRAM(s) IV Push every 8 hours  ondansetron Injectable 4 milliGRAM(s) IV Push every 4 hours PRN Nausea and/or Vomiting  oxyCODONE    5 mG/acetaminophen 325 mG 1 Tablet(s) Oral once PRN Moderate Pain (4 - 6)    Respiratory Medications    Cardiovascular Medications    Gastrointestinal Medications  lactated ringers. 1000 milliLiter(s) IV Continuous <Continuous>  pantoprazole  Injectable 40 milliGRAM(s) IV Push every 24 hours    Genitourinary Medications    Hematologic/Oncologic Medications  heparin  Injectable 7500 Unit(s) SubCutaneous every 8 hours    Antimicrobial/Immunologic Medications  cefoTEtan  IVPB 2 Gram(s) IV Intermittent every 12 hours    Endocrine/Metabolic Medications    Topical/Other Medications      VITAL SIGNS, INS/OUTS (last 24 hours):    ICU Vital Signs Last 24 Hrs  T(C): 36.6 (11 Mar 2019 11:40), Max: 36.8 (11 Mar 2019 06:26)  T(F): 97.9 (11 Mar 2019 11:40), Max: 98.2 (11 Mar 2019 06:26)  HR: 94 (11 Mar 2019 13:00) (90 - 121)  BP: 134/68 (11 Mar 2019 13:00) (134/68 - 175/95)  BP(mean): 89 (11 Mar 2019 13:00) (89 - 124)  RR: 22 (11 Mar 2019 13:00) (16 - 25)  SpO2: 97% (11 Mar 2019 13:00) (93% - 100%)    I&O's Summary    11 Mar 2019 07:01  -  11 Mar 2019 13:27  --------------------------------------------------------  IN: 180 mL / OUT: 150 mL / NET: 30 mL    Height (cm): 170.18 (03-11-19)  Weight (kg): 163.3 (03-11-19)  BMI (kg/m2): 56.4 (03-11-19)  BSA (m2): 2.6 (03-11-19)    Physical Exam:  ---------------------------------------------------------------------------------------  GCS: 15  Exam: A&Ox3, no focal deficits    RESPIRATORY:  Nasacl cannula    CARDIOVASCULAR:   S1/S2. Sinus tachy    GASTROINTESTINAL:  Abdomen soft, non-tender, non-distended  -5 port incision sites    MUSCULOSKELETAL:  Extremities warm, pink, well-perfused.  b/l radial pulses, palpable    :   Exam: Levi catheter in place.       Tubes/Lines/Drains   ----------------------------------------------------------------------------------------------------------  [x] Peripheral IV  [X] Urinary Catheter Levi         Yes                     Date Placed: 03-11    LABS  --------------------------------------------------------------------------------------  2330 Labs    CAPILLARY BLOOD GLUCOSE  POCT Blood Glucose.: 102 mg/dL (11 Mar 2019 06:07)    CT/XRAY/ECHO/TCD/EEG  ----------------------------------------------------------------------------------------------  AM CXR      --------------------------------------------------------------------------------------  Admit Diagnosis: elective bariatric surgery      Critical Care Diagnoses: morbid

## 2019-03-11 NOTE — CHART NOTE - NSCHARTNOTEFT_GEN_A_CORE
PCA requested by surgical team.  Written for morphine PCA 1mg q6h with 4h lockout 14mg.  Informed acute pain service who will continue to follow

## 2019-03-11 NOTE — CONSULT NOTE ADULT - ASSESSMENT
Assessment and Plan:  28y Male         NEUROLOGY: Pain-controlled with     RX-aspirin 325 mg oral tablet: prn headache - at timesadvised hold any ASA at this time-        RESPIRATORY:  RR: 16 (03-11 @ 13:45) (16 - 25)  SpO2: 97% (03-11 @ 13:45) (93% - 100%)    respRX-      ABG            Cardiovascular:   -  Troponin      CK      CKMB      Gastrointestinal/Nutrition:   -    Renal/Genitourinary:   -Cr 1 Feb value    Hematologic: Hep Sub Q 7500 q8 due to high BMI    Infectious Disease:   -Afebrile T(C): 36.6 (03-11 @ 11:40), Max: 36.8     Endocrine:   -Glu 102 mg/dL (03-11 @ 06:07)  -FSG q6    Lines/Tubes: PIV, Hedrick    Disposition: SICU for hemodynamic monitoring Assessment and Plan:  28y Male s/p lap anish-en-y    NEUROLOGY: Pain-controlled with dialudid post op, PCA morphine as per Dr. Garcia  -spoke with anesthesia Dr. Nails and Jd Pain management at 1300, will start morphine PCA    RESPIRATORY: Nasal cannula satting 97%    CARDIOVASCULAR: Tachycardia, Hypertension 2/2 pain, resolved with pain medication adminstration  -f/u anesthesia and pain management regarding PCA morphine PCA  -primary team made aware patient is currently not on PCA    GI: NPO, PPI    RENAL/: Hedrick in place, to be removed at midline  -Cr 1 baseline    HEMATOLOGIC: Hep Sub Q 7500 q8 due to high BMI    INFECTIOUS DISEASE:  -Afebrile T(C): 36.6 (03-11 @ 11:40), Max: 36.8     ENDOCRINE:  -Glu 102 mg/dL (03-11 @ 06:07)  -FSG q6    Lines/Tubes: PIV, Hedrick    Disposition: SICU for hemodynamic monitoring

## 2019-03-11 NOTE — PACU DISCHARGE NOTE - COMMENTS
Pt tolerated procedure  Perioperative course uneventful  No obvious anesthesia related issues  Sign out to SICU team Darnell

## 2019-03-11 NOTE — BRIEF OPERATIVE NOTE - PROCEDURE
<<-----Click on this checkbox to enter Procedure Laparoscopic gastric bypass and Linn-en-Y gastroenterostomy with Linn limb 150 cm or less  03/11/2019    Active  [id number]

## 2019-03-11 NOTE — CHART NOTE - NSCHARTNOTEFT_GEN_A_CORE
Post Operative Note  Patient: GABY CERDA 28y (1990) Male   MRN: [id number]  Location: [facility] 004 A  Visit: 03-11-19 Inpatient  Date: 03-11-19 @ 18:02    Procedure: S/P laparoscopic RNY gastric bypass    Subjective: patient feels well, has not ambualted yet, pulling 1500 on IS, no nausea      Objective:  Vitals: T(F): 99 (03-11-19 @ 16:00), Max: 99 (03-11-19 @ 16:00)  HR: 99 (03-11-19 @ 17:00)  BP: 116/65 (03-11-19 @ 17:00) (116/65 - 175/95)  RR: 16 (03-11-19 @ 17:00)  SpO2: 94% (03-11-19 @ 17:00)  Vent Settings:     In:   03-11-19 @ 07:01  -  03-11-19 @ 18:02  --------------------------------------------------------  IN: 680 mL      IV Fluids: lactated ringers. 1000 milliLiter(s) (125 mL/Hr) IV Continuous <Continuous>      Out:   03-11-19 @ 07:01  -  03-11-19 @ 18:02  --------------------------------------------------------  OUT: 545 mL      Voided Urine:   03-11-19 @ 07:01  -  03-11-19 @ 18:02  --------------------------------------------------------  OUT: 545 mL    Physical Examination:  General Appearance: NAD, alert and cooperative  HEENT: NCAT,   Heart: S1 and S2. No murmurs. Rhythm is regular  Lungs: CTAB  Abdomen:  Positive bowel sounds. Soft, nondistended, +advid-incisional tenderness   Wounds/Incions: incisions w/ dressings in place, clean, dry, intact, no signs of infection    Medications: [Standing]  cefoTEtan  IVPB 2 Gram(s) IV Intermittent every 12 hours  heparin  Injectable 7500 Unit(s) SubCutaneous every 8 hours  HYDROmorphone  Injectable 1 milliGRAM(s) IV Push every 10 minutes PRN  lactated ringers. 1000 milliLiter(s) IV Continuous <Continuous>  morphine PCA (5 mG/mL) 30 milliLiter(s) PCA Continuous PCA Continuous  naloxone Injectable 0.1 milliGRAM(s) IV Push every 3 minutes PRN  ondansetron Injectable 4 milliGRAM(s) IV Push every 6 hours PRN  ondansetron Injectable 4 milliGRAM(s) IV Push every 8 hours  ondansetron Injectable 4 milliGRAM(s) IV Push every 4 hours PRN  oxyCODONE    5 mG/acetaminophen 325 mG 1 Tablet(s) Oral once PRN  pantoprazole  Injectable 40 milliGRAM(s) IV Push every 24 hours    Medications: [PRN]  cefoTEtan  IVPB 2 Gram(s) IV Intermittent every 12 hours  heparin  Injectable 7500 Unit(s) SubCutaneous every 8 hours  HYDROmorphone  Injectable 1 milliGRAM(s) IV Push every 10 minutes PRN  lactated ringers. 1000 milliLiter(s) IV Continuous <Continuous>  morphine PCA (5 mG/mL) 30 milliLiter(s) PCA Continuous PCA Continuous  naloxone Injectable 0.1 milliGRAM(s) IV Push every 3 minutes PRN  ondansetron Injectable 4 milliGRAM(s) IV Push every 6 hours PRN  ondansetron Injectable 4 milliGRAM(s) IV Push every 8 hours  ondansetron Injectable 4 milliGRAM(s) IV Push every 4 hours PRN  oxyCODONE    5 mG/acetaminophen 325 mG 1 Tablet(s) Oral once PRN  pantoprazole  Injectable 40 milliGRAM(s) IV Push every 24 hours    Labs:      Assessment:  28yMale patient S/P laparoscopic RNY gastric bypass    Plan:  - NPO until tomorrow AM  - encourage ambulation   - encourage IS   - PCA for pain control       Date/Time: 03-11-19 @ 18:02

## 2019-03-12 DIAGNOSIS — Z71.89 OTHER SPECIFIED COUNSELING: ICD-10-CM

## 2019-03-12 LAB
ANION GAP SERPL CALC-SCNC: 13 MMOL/L — SIGNIFICANT CHANGE UP (ref 7–14)
BASOPHILS # BLD AUTO: 0.01 K/UL — SIGNIFICANT CHANGE UP (ref 0–0.2)
BASOPHILS NFR BLD AUTO: 0.1 % — SIGNIFICANT CHANGE UP (ref 0–1)
BUN SERPL-MCNC: 9 MG/DL — LOW (ref 10–20)
CALCIUM SERPL-MCNC: 8.7 MG/DL — SIGNIFICANT CHANGE UP (ref 8.5–10.1)
CHLORIDE SERPL-SCNC: 103 MMOL/L — SIGNIFICANT CHANGE UP (ref 98–110)
CO2 SERPL-SCNC: 21 MMOL/L — SIGNIFICANT CHANGE UP (ref 17–32)
CREAT SERPL-MCNC: 0.9 MG/DL — SIGNIFICANT CHANGE UP (ref 0.7–1.5)
EOSINOPHIL # BLD AUTO: 0 K/UL — SIGNIFICANT CHANGE UP (ref 0–0.7)
EOSINOPHIL NFR BLD AUTO: 0 % — SIGNIFICANT CHANGE UP (ref 0–8)
GLUCOSE BLDC GLUCOMTR-MCNC: 108 MG/DL — HIGH (ref 70–99)
GLUCOSE BLDC GLUCOMTR-MCNC: 111 MG/DL — HIGH (ref 70–99)
GLUCOSE BLDC GLUCOMTR-MCNC: 86 MG/DL — SIGNIFICANT CHANGE UP (ref 70–99)
GLUCOSE BLDC GLUCOMTR-MCNC: 87 MG/DL — SIGNIFICANT CHANGE UP (ref 70–99)
GLUCOSE SERPL-MCNC: 119 MG/DL — HIGH (ref 70–99)
HCT VFR BLD CALC: 41.6 % — LOW (ref 42–52)
HGB BLD-MCNC: 13.3 G/DL — LOW (ref 14–18)
IMM GRANULOCYTES NFR BLD AUTO: 0.2 % — SIGNIFICANT CHANGE UP (ref 0.1–0.3)
LYMPHOCYTES # BLD AUTO: 0.89 K/UL — LOW (ref 1.2–3.4)
LYMPHOCYTES # BLD AUTO: 10 % — LOW (ref 20.5–51.1)
MAGNESIUM SERPL-MCNC: 2 MG/DL — SIGNIFICANT CHANGE UP (ref 1.8–2.4)
MCHC RBC-ENTMCNC: 27.7 PG — SIGNIFICANT CHANGE UP (ref 27–31)
MCHC RBC-ENTMCNC: 32 G/DL — SIGNIFICANT CHANGE UP (ref 32–37)
MCV RBC AUTO: 86.5 FL — SIGNIFICANT CHANGE UP (ref 80–94)
MONOCYTES # BLD AUTO: 0.56 K/UL — SIGNIFICANT CHANGE UP (ref 0.1–0.6)
MONOCYTES NFR BLD AUTO: 6.3 % — SIGNIFICANT CHANGE UP (ref 1.7–9.3)
NEUTROPHILS # BLD AUTO: 7.38 K/UL — HIGH (ref 1.4–6.5)
NEUTROPHILS NFR BLD AUTO: 83.4 % — HIGH (ref 42.2–75.2)
NRBC # BLD: 0 /100 WBCS — SIGNIFICANT CHANGE UP (ref 0–0)
PHOSPHATE SERPL-MCNC: 3.4 MG/DL — SIGNIFICANT CHANGE UP (ref 2.1–4.9)
PLATELET # BLD AUTO: 242 K/UL — SIGNIFICANT CHANGE UP (ref 130–400)
POTASSIUM SERPL-MCNC: 4.4 MMOL/L — SIGNIFICANT CHANGE UP (ref 3.5–5)
POTASSIUM SERPL-SCNC: 4.4 MMOL/L — SIGNIFICANT CHANGE UP (ref 3.5–5)
RBC # BLD: 4.81 M/UL — SIGNIFICANT CHANGE UP (ref 4.7–6.1)
RBC # FLD: 14.2 % — SIGNIFICANT CHANGE UP (ref 11.5–14.5)
SODIUM SERPL-SCNC: 137 MMOL/L — SIGNIFICANT CHANGE UP (ref 135–146)
WBC # BLD: 8.86 K/UL — SIGNIFICANT CHANGE UP (ref 4.8–10.8)
WBC # FLD AUTO: 8.86 K/UL — SIGNIFICANT CHANGE UP (ref 4.8–10.8)

## 2019-03-12 RX ORDER — ACETAMINOPHEN 500 MG
1000 TABLET ORAL ONCE
Qty: 0 | Refills: 0 | Status: COMPLETED | OUTPATIENT
Start: 2019-03-12 | End: 2019-03-12

## 2019-03-12 RX ORDER — OXYCODONE AND ACETAMINOPHEN 5; 325 MG/1; MG/1
1 TABLET ORAL EVERY 6 HOURS
Qty: 0 | Refills: 0 | Status: DISCONTINUED | OUTPATIENT
Start: 2019-03-12 | End: 2019-03-13

## 2019-03-12 RX ADMIN — OXYCODONE AND ACETAMINOPHEN 1 TABLET(S): 5; 325 TABLET ORAL at 14:03

## 2019-03-12 RX ADMIN — ONDANSETRON 4 MILLIGRAM(S): 8 TABLET, FILM COATED ORAL at 05:23

## 2019-03-12 RX ADMIN — HEPARIN SODIUM 7500 UNIT(S): 5000 INJECTION INTRAVENOUS; SUBCUTANEOUS at 10:24

## 2019-03-12 RX ADMIN — OXYCODONE AND ACETAMINOPHEN 1 TABLET(S): 5; 325 TABLET ORAL at 20:48

## 2019-03-12 RX ADMIN — HEPARIN SODIUM 7500 UNIT(S): 5000 INJECTION INTRAVENOUS; SUBCUTANEOUS at 17:15

## 2019-03-12 RX ADMIN — ONDANSETRON 4 MILLIGRAM(S): 8 TABLET, FILM COATED ORAL at 13:29

## 2019-03-12 RX ADMIN — OXYCODONE AND ACETAMINOPHEN 1 TABLET(S): 5; 325 TABLET ORAL at 14:45

## 2019-03-12 RX ADMIN — HEPARIN SODIUM 7500 UNIT(S): 5000 INJECTION INTRAVENOUS; SUBCUTANEOUS at 23:55

## 2019-03-12 RX ADMIN — ONDANSETRON 4 MILLIGRAM(S): 8 TABLET, FILM COATED ORAL at 21:04

## 2019-03-12 RX ADMIN — Medication 400 MILLIGRAM(S): at 07:10

## 2019-03-12 RX ADMIN — OXYCODONE AND ACETAMINOPHEN 1 TABLET(S): 5; 325 TABLET ORAL at 19:55

## 2019-03-12 RX ADMIN — Medication 100 GRAM(S): at 07:20

## 2019-03-12 RX ADMIN — PANTOPRAZOLE SODIUM 40 MILLIGRAM(S): 20 TABLET, DELAYED RELEASE ORAL at 13:43

## 2019-03-12 RX ADMIN — Medication 100 GRAM(S): at 21:01

## 2019-03-12 RX ADMIN — HEPARIN SODIUM 7500 UNIT(S): 5000 INJECTION INTRAVENOUS; SUBCUTANEOUS at 01:55

## 2019-03-12 NOTE — CHART NOTE - NSCHARTNOTEFT_GEN_A_CORE
27 YO M s/p lap RYGB - POD #1.  Tolerating spencer clear liquid diet well at 4 oz/hr.  Has protein shakes and chewable vits/mins at home.  Patient verbalized understanding of phase I diet to begin upon discharge.  DROP sheet reviewed with patient and all questions answered.  Will follow up in office next week.  Call [phone] with questions/concerns.

## 2019-03-12 NOTE — PROGRESS NOTE ADULT - ASSESSMENT
Assessment and Plan:  28y Male s/p lap anish-en-y    NEUROLOGY: Pain-controlled with dialudid post op, PCA morphine as per Dr. Garcia.     RESPIRATORY: Nasal cannula satting 97%    CARDIOVASCULAR: Tachycardia, Hypertension 2/2 pain, resolved with pain medication adminstration  -f/u anesthesia and pain management regarding PCA morphine PCA  -primary team made aware patient is currently not on PCA    GI: NPO, PPI    RENAL/: Hedrick in place, to be removed at midline  -Cr 1 baseline    HEMATOLOGIC: Hep Sub Q 7500 q8 due to high BMI    INFECTIOUS DISEASE:  -Afebrile T(C): 36.6 (03-11 @ 11:40), Max: 36.8     ENDOCRINE:  -Glu 102 mg/dL (03-11 @ 06:07)  -FSG q6    Lines/Tubes: PIV, Hedrick    Disposition: SICU for hemodynamic monitoring Assessment and Plan:  28y Male s/p lap anish-en-y    NEUROLOGY: Pain-controlled with dialudid post op, PCA morphine as per Dr. Garcia.     RESPIRATORY: Nasal cannula satting 97%    CARDIOVASCULAR: Tachycardia, Hypertension 2/2 pain, resolved with pain medication adminstration  -f/u anesthesia and pain management regardg PCA morphine PCA  -primary team made aware patient is currently not on PCA    GI: Otis clears, PPI    RENAL/: Voiding freely  -Cr 1 baseline    HEMATOLOGIC: Hep Sub Q 7500 q8 due to high BMI    INFECTIOUS DISEASE:  -Afebrile T(C): 36.6 (03-11 @ 11:40), Max: 36.8     ENDOCRINE:  -Glu 102 mg/dL (03-11 @ 06:07)  -FSG q6    Lines/Tubes: PIV, Hedrick    Disposition: Downgrade Assessment and Plan:  28y Male s/p lap anish-en-y    NEUROLOGY: Pain-controlled with dialudid post op, PCA morphine as per Dr. Garcia.     RESPIRATORY: Nasal cannula satting 97%    CARDIOVASCULAR: Tachycardia, Hypertension 2/2 pain, resolved with pain medication adminstration  -f/u anesthesia and pain management regardg PCA morphine PCA  -primary team made aware patient is currently not on PCA  - mild sinus tachycardia most likely pain related - but will observe in icu for couple more hours to ensure resolution prior to downgrade    GI: Otis clears, PPI    RENAL/: Voiding freely  -Cr 1 baseline    HEMATOLOGIC: Hep Sub Q 7500 q8 due to high BMI    INFECTIOUS DISEASE:  -Afebrile T(C): 36.6 (03-11 @ 11:40), Max: 36.8     ENDOCRINE:  -Glu 102 mg/dL (03-11 @ 06:07)  -FSG q6    Lines/Tubes: PIV, Hedrick    Disposition: Downgrade

## 2019-03-12 NOTE — PROGRESS NOTE ADULT - ASSESSMENT
Assessment:  28y Male patient s/p laparoscopic RNY gastric bypass    Plan:  - start bariatric phase 1 this AM  - Will d/c PCA once he has advanced to 3rd row of 3oz later today and start crushed percocet   - DVT & GI PPX  - F/u TOV this AM  - Encourage Ambulation and IS

## 2019-03-12 NOTE — PATIENT PROFILE ADULT - ABILITY TO HEAR (WITH HEARING AID OR HEARING APPLIANCE IF NORMALLY USED):
left ear tinnitus/Mildly to Moderately Impaired: difficulty hearing in some environments or speaker may need to increase volume or speak distinctly

## 2019-03-12 NOTE — CHART NOTE - NSCHARTNOTEFT_GEN_A_CORE
SICU PA Transfer note:     Assessment and Plan:  28y Male  pmh morbid obesity (BMI 54), ANTOLIN on CPAP, chronic back pain, anxiety, s/p lap anish-en-y gastric bypass    NEUROLOGY: Morphine PCA d/c'd, started crushed percocet for pain.     RESPIRATORY: Nasal cannula satting 97%, wean as tolerated, CPAP at night    CARDIOVASCULAR: Tachycardia 2/2 pain, resolved with pain medication  Primary team made aware, Dr. Gutierrez ok with downgrade to floor    GI: Otis clears, PPI, tolerating 4 oz    RENAL/: Voiding freely  -Cr 1 baseline    HEMATOLOGIC: Hep Sub Q 7500 q8 due to high BMI  h/h remains stable    INFECTIOUS DISEASE:  -Afebrile T(C): 36.6 (03-11 @ 11:40), Max: 36.8     ENDOCRINE:  -Glu 102 mg/dL (03-11 @ 06:07)  -FSG q6    Lines/Tubes: PIV    Disposition: Downgrade to floor   Sign-out given to Dr. You

## 2019-03-12 NOTE — PROGRESS NOTE ADULT - SUBJECTIVE AND OBJECTIVE BOX
Progress Note: Surgery  Patient: GABY CERDA , 28y (1990)Male   MRN: [id number]  Location: [facility] 004 A  Visit: 03-11-19 Inpatient  Date: 03-12-19 @ 05:26    Hospital Day: 2    Post-op Day: 1    Procedure/Injury: s/p laparoscopic RNY gastric bypass    Events over 24h: Patient complaining of pain overnight but not using the PCA very often, he denies any nausea or vomiting. Is ambulating well but has not tried going to the bathroom since his levi was removed at midnight.     Vitals: T(F): 98.9 (03-12-19 @ 00:00), Max: 99.1 (03-11-19 @ 22:00)  HR: 101 (03-12-19 @ 03:00)  BP: 148/80 (03-12-19 @ 03:00) (116/65 - 175/95)  RR: 17 (03-12-19 @ 03:00)  SpO2: 98% (03-12-19 @ 03:00)    Diet: Diet, NPO (03-11-19 @ 11:29)      Bowel function:   Bowel movement [no]   Flatus [no]    Out:   03-11-19 @ 07:01  -  03-12-19 @ 05:26  --------------------------------------------------------  OUT:    Indwelling Catheter - Urethral: 1080 mL  Total OUT: 1080 mL    Physical Examination:  General: NAD, lying in bed comfortably   HEENT: NCAT  Heart: S1 S2, No MRG RRR   Lungs: CTABL +BS Equal BL, No WRC  Abdomen: Soft, mildly distended, tender near incisions.   Incisions/Wounds: Dressings in place, clean, dry and intact, no signs of infection/active bleeding/drainage    Medications: [Standing]  cefoTEtan  IVPB 2 Gram(s) IV Intermittent every 12 hours  heparin  Injectable 7500 Unit(s) SubCutaneous every 8 hours  lactated ringers. 1000 milliLiter(s) (125 mL/Hr) IV Continuous <Continuous>  morphine PCA (5 mG/mL) 30 milliLiter(s) PCA Continuous PCA Continuous  ondansetron Injectable 4 milliGRAM(s) IV Push every 8 hours  pantoprazole  Injectable 40 milliGRAM(s) IV Push every 24 hours    Medications:[PRN]  HYDROmorphone  Injectable 1 milliGRAM(s) IV Push every 15 minutes PRN  naloxone Injectable 0.1 milliGRAM(s) IV Push every 3 minutes PRN  ondansetron Injectable 4 milliGRAM(s) IV Push every 6 hours PRN  ondansetron Injectable 4 milliGRAM(s) IV Push every 4 hours PRN    Labs:                        13.3   8.86  )-----------( 242      ( 11 Mar 2019 23:40 )             41.6     03-11    137  |  103  |  9<L>  ----------------------------<  119<H>  4.4   |  21  |  0.9    Ca    8.7      11 Mar 2019 23:40  Phos  3.4     03-11  Mg     2.0     03-11    Imaging:  None/24h    Date/Time: 03-12-19 @ 05:26

## 2019-03-12 NOTE — PROGRESS NOTE ADULT - SUBJECTIVE AND OBJECTIVE BOX
GABY ROSS  [id number]  28y Male    Indication for ICU admission: elective bariatric surgery. s/p lap anish-en-y  Admit Date:03-11-19  ICU Date: 03-11-19  OR Date: 03-11-19    No Known Allergies    PAST MEDICAL & SURGICAL HISTORY:  Tinnitus  ANTOLIN on CPAP  Depression  Anxiety  Obesity  History of esophagogastroduodenoscopy (EGD)    Home Medications:  aspirin 325 mg oral tablet: prn headache - at timesadvised hold any ASA at this time- (11 Mar 2019 07:47)  Vitamin D3 2000 intl units oral capsule: 1 cap(s) orally once a day (11 Mar 2019 07:47)      OR time:   3hours   EBL: 5cc         IV Fluids:1300 IVF       Blood Products:      None         UOP:    150cc with levi    24HRS EVENT:  Post op day 1 (3/12/19)    Overnight: Hemodynamically stable. Complaining of pain. Morphine PCA, anesthesia gave one breakthrough dose of morphine. Ambulating. Levi out at midnight, due to void at 8 AM. HR  all night. No episodes of N/V     NEUROLOGY: Pain-controlled with , PCA morphine as per Dr. Garcia Patient hypertensive most likely secondary to pain, which resolved with morphine PCA.       RESPIRATORY: Nasal cannula satting 97%. wean to room air, patient already ambulating.     CARDIOVASCULAR: Tachycardia, Hypertension 2/2 pain, resolved with pain medication adminstration    -    GI: NPO, PPI. follow up with vascular in the am regarding spencer clears     RENAL/: Levi in place, to be removed at midline  -Cr 1 baseline. trial of void at 8am.     HEMATOLOGIC: Hep Sub Q 7500 q8 due to high BMI    INFECTIOUS DISEASE:  periop ancef follow up with primary care team regarding end date/dosage  -Afebrile T(C): 36.6 (03-11 @ 11:40), Max: 36.8     ENDOCRINE:  -Glu 102 mg/dL (03-11 @ 06:07)  -FSG q6    Lines/Tubes: PIV, Levi. levi to be d/c'ed at midnight    Disposition: SICU for hemodynamic monitoring        DVT PTX: heparin 7500 units subQ every 8 hours     GI PTX: pantoprazole 40mg IV push Q 24 hours     ***Tubes/Lines/Drains  ***  Peripheral IV  Urinary Catheter	yes	Indication: Strict I&O    Date Placed: 3/11/19      REVIEW OF SYSTEMS    [X ] A ten-point review of systems was otherwise negative except as noted.  [ ] Due to altered mental status/intubation, subjective information were not able to be obtained from the patient. History was obtained, to the extent possible, from review of the chart and collateral sources of information. GABY ROSS  [id number]  28y Male    Indication for ICU admission: elective bariatric surgery. s/p lap anish-en-y  Admit Date:03-11-19  ICU Date: 03-11-19  OR Date: 03-11-19    No Known Allergies    PAST MEDICAL & SURGICAL HISTORY:  Tinnitus  ANTOLIN on CPAP  Depression  Anxiety  Obesity  History of esophagogastroduodenoscopy (EGD)    Home Medications:  aspirin 325 mg oral tablet: prn headache - at timesadvised hold any ASA at this time- (11 Mar 2019 07:47)  Vitamin D3 2000 intl units oral capsule: 1 cap(s) orally once a day (11 Mar 2019 07:47)      OR time:   3hours   EBL: 5cc         IV Fluids:1300 IVF       Blood Products:      None         UOP:    150cc with levi    24HRS EVENT:  Post op day 1 (3/12/19)    Overnight: Hemodynamically stable. Complaining of pain. Morphine PCA, anesthesia gave one breakthrough dose of morphine. Ambulating. Elvi out at midnight, due to void at 8 AM. HR  all night. No episodes of N/V     NEUROLOGY: Pain-controlled with , PCA morphine as per Dr. Garcia Patient hypertensive most likely secondary to pain, which resolved with morphine PCA.       RESPIRATORY: Nasal cannula satting 97%. wean to room air, patient already ambulating.     CARDIOVASCULAR: Tachycardia, Hypertension 2/2 pain, resolved with pain medication adminstration    -    GI: NPO, PPI. follow up with vascular in the am regarding spencer clears     RENAL/: Levi in place, to be removed at midline  -Cr 1 baseline. trial of void at 8am.     HEMATOLOGIC: Hep Sub Q 7500 q8 due to high BMI    INFECTIOUS DISEASE:  periop ancef follow up with primary care team regarding end date/dosage  -Afebrile T(C): 36.6 (03-11 @ 11:40), Max: 36.8     ENDOCRINE:  -Glu 102 mg/dL (03-11 @ 06:07)  -FSG q6    Lines/Tubes: PIV, Levi. levi to be d/c'ed at midnight    Disposition: SICU for hemodynamic monitoring        DVT PTX: heparin 7500 units subQ every 8 hours     GI PTX: pantoprazole 40mg IV push Q 24 hours     ***Tubes/Lines/Drains  ***  Peripheral IV  Urinary Catheter	yes	Indication: Strict I&O    Date Placed: 3/11/19      REVIEW OF SYSTEMS    [X ] A ten-point review of systems was otherwise negative except as noted.  [ ] Due to altered mental status/intubation, subjective information were not able to be obtained from the patient. History was obtained, to the extent possible, from review of the chart and collateral sources of information.      Daily     Daily     Diet, Clear Liquid:   Bariatric Clear Liquid (BARICLLIQ) (03-12-19 @ 06:55)      CURRENT MEDS:  Neurologic Medications  morphine PCA (5 mG/mL) 30 milliLiter(s) PCA Continuous PCA Continuous  ondansetron Injectable 4 milliGRAM(s) IV Push every 6 hours PRN Nausea  ondansetron Injectable 4 milliGRAM(s) IV Push every 8 hours    Respiratory Medications    Cardiovascular Medications    Gastrointestinal Medications  lactated ringers. 1000 milliLiter(s) IV Continuous <Continuous>  pantoprazole  Injectable 40 milliGRAM(s) IV Push every 24 hours    Genitourinary Medications    Hematologic/Oncologic Medications  heparin  Injectable 7500 Unit(s) SubCutaneous every 8 hours    Antimicrobial/Immunologic Medications  cefoTEtan  IVPB 2 Gram(s) IV Intermittent every 12 hours    Endocrine/Metabolic Medications    Topical/Other Medications  naloxone Injectable 0.1 milliGRAM(s) IV Push every 3 minutes PRN For ANY of the following changes in patient status:  A. RR LESS THAN 10 breaths per minute, B. Oxygen saturation LESS THAN 90%, C. Sedation score of 6      ICU Vital Signs Last 24 Hrs  T(C): 37.1 (12 Mar 2019 05:00), Max: 37.3 (11 Mar 2019 22:00)  T(F): 98.8 (12 Mar 2019 05:00), Max: 99.1 (11 Mar 2019 22:00)  HR: 99 (12 Mar 2019 08:00) (84 - 105)  BP: 122/66 (12 Mar 2019 08:00) (116/65 - 175/95)  BP(mean): 102 (12 Mar 2019 00:00) (73 - 124)  ABP: --  ABP(mean): --  RR: 20 (12 Mar 2019 08:00) (13 - 25)  SpO2: 96% (12 Mar 2019 08:00) (93% - 100%)      Adult Advanced Hemodynamics Last 24 Hrs  CVP(mm Hg): --  CVP(cm H2O): --  CO: --  CI: --  PA: --  PA(mean): --  PCWP: --  SVR: --  SVRI: --  PVR: --  PVRI: --          I&O's Summary    11 Mar 2019 07:01  -  12 Mar 2019 07:00  --------------------------------------------------------  IN: 2480 mL / OUT: 1480 mL / NET: 1000 mL    12 Mar 2019 07:01  -  12 Mar 2019 08:11  --------------------------------------------------------  IN: 125 mL / OUT: 0 mL / NET: 125 mL      I&O's Detail    11 Mar 2019 07:01  -  12 Mar 2019 07:00  --------------------------------------------------------  IN:    IV PiggyBack: 50 mL    lactated ringers.: 2250 mL    lactated ringers.: 180 mL  Total IN: 2480 mL    OUT:    Indwelling Catheter - Urethral: 1080 mL    Voided: 400 mL  Total OUT: 1480 mL    Total NET: 1000 mL      12 Mar 2019 07:01  -  12 Mar 2019 08:11  --------------------------------------------------------  IN:    lactated ringers.: 125 mL  Total IN: 125 mL    OUT:  Total OUT: 0 mL    Total NET: 125 mL          PHYSICAL EXAM:    General/Neuro  RASS:  0           GCS: 15    Deficits: alert & oriented x 3, no focal deficits    Lungs: clear to auscultation, Normal expansion/effort.     Cardiovascular : S1, S2.  Regular rate and rhythm.  Peripheral edema   Cardiac Rhythm: Normal Sinus Rhythm    GI: Abdomen soft, appropriately-tender diffusely, Non-distended. No guarding or rigidity.     Extremities: Extremities warm, pink, well-perfused.     Derm: Good skin turgor, no skin breakdown.      : voiding freely      CXR:     LABS:  CAPILLARY BLOOD GLUCOSE      POCT Blood Glucose.: 111 mg/dL (12 Mar 2019 07:32)  POCT Blood Glucose.: 110 mg/dL (11 Mar 2019 23:20)  POCT Blood Glucose.: 143 mg/dL (11 Mar 2019 17:35)                          13.3   8.86  )-----------( 242      ( 11 Mar 2019 23:40 )             41.6       03-11    137  |  103  |  9<L>  ----------------------------<  119<H>  4.4   |  21  |  0.9    Ca    8.7      11 Mar 2019 23:40  Phos  3.4     03-11  Mg     2.0     03-11

## 2019-03-13 ENCOUNTER — TRANSCRIPTION ENCOUNTER (OUTPATIENT)
Age: 29
End: 2019-03-13

## 2019-03-13 VITALS
OXYGEN SATURATION: 95 % | HEART RATE: 101 BPM | TEMPERATURE: 98 F | RESPIRATION RATE: 18 BRPM | DIASTOLIC BLOOD PRESSURE: 82 MMHG | SYSTOLIC BLOOD PRESSURE: 136 MMHG

## 2019-03-13 LAB
ANION GAP SERPL CALC-SCNC: 17 MMOL/L — HIGH (ref 7–14)
BUN SERPL-MCNC: 9 MG/DL — LOW (ref 10–20)
CALCIUM SERPL-MCNC: 8.7 MG/DL — SIGNIFICANT CHANGE UP (ref 8.5–10.1)
CHLORIDE SERPL-SCNC: 102 MMOL/L — SIGNIFICANT CHANGE UP (ref 98–110)
CO2 SERPL-SCNC: 19 MMOL/L — SIGNIFICANT CHANGE UP (ref 17–32)
CREAT SERPL-MCNC: 1.2 MG/DL — SIGNIFICANT CHANGE UP (ref 0.7–1.5)
GLUCOSE BLDC GLUCOMTR-MCNC: 91 MG/DL — SIGNIFICANT CHANGE UP (ref 70–99)
GLUCOSE SERPL-MCNC: 91 MG/DL — SIGNIFICANT CHANGE UP (ref 70–99)
HCT VFR BLD CALC: 43 % — SIGNIFICANT CHANGE UP (ref 42–52)
HGB BLD-MCNC: 13.4 G/DL — LOW (ref 14–18)
MAGNESIUM SERPL-MCNC: 1.8 MG/DL — SIGNIFICANT CHANGE UP (ref 1.8–2.4)
MCHC RBC-ENTMCNC: 27.4 PG — SIGNIFICANT CHANGE UP (ref 27–31)
MCHC RBC-ENTMCNC: 31.2 G/DL — LOW (ref 32–37)
MCV RBC AUTO: 87.9 FL — SIGNIFICANT CHANGE UP (ref 80–94)
NRBC # BLD: 0 /100 WBCS — SIGNIFICANT CHANGE UP (ref 0–0)
PHOSPHATE SERPL-MCNC: 2.5 MG/DL — SIGNIFICANT CHANGE UP (ref 2.1–4.9)
PLATELET # BLD AUTO: 312 K/UL — SIGNIFICANT CHANGE UP (ref 130–400)
POTASSIUM SERPL-MCNC: 4.8 MMOL/L — SIGNIFICANT CHANGE UP (ref 3.5–5)
POTASSIUM SERPL-SCNC: 4.8 MMOL/L — SIGNIFICANT CHANGE UP (ref 3.5–5)
RBC # BLD: 4.89 M/UL — SIGNIFICANT CHANGE UP (ref 4.7–6.1)
RBC # FLD: 14.6 % — HIGH (ref 11.5–14.5)
SODIUM SERPL-SCNC: 138 MMOL/L — SIGNIFICANT CHANGE UP (ref 135–146)
WBC # BLD: 7.26 K/UL — SIGNIFICANT CHANGE UP (ref 4.8–10.8)
WBC # FLD AUTO: 7.26 K/UL — SIGNIFICANT CHANGE UP (ref 4.8–10.8)

## 2019-03-13 RX ORDER — ACETAMINOPHEN 500 MG
1000 TABLET ORAL ONCE
Qty: 0 | Refills: 0 | Status: COMPLETED | OUTPATIENT
Start: 2019-03-13 | End: 2019-03-13

## 2019-03-13 RX ORDER — KETOROLAC TROMETHAMINE 30 MG/ML
15 SYRINGE (ML) INJECTION ONCE
Qty: 0 | Refills: 0 | Status: DISCONTINUED | OUTPATIENT
Start: 2019-03-13 | End: 2019-03-13

## 2019-03-13 RX ADMIN — Medication 15 MILLIGRAM(S): at 09:12

## 2019-03-13 RX ADMIN — Medication 15 MILLIGRAM(S): at 09:45

## 2019-03-13 RX ADMIN — OXYCODONE AND ACETAMINOPHEN 1 TABLET(S): 5; 325 TABLET ORAL at 01:56

## 2019-03-13 RX ADMIN — Medication 100 GRAM(S): at 08:10

## 2019-03-13 RX ADMIN — OXYCODONE AND ACETAMINOPHEN 1 TABLET(S): 5; 325 TABLET ORAL at 02:28

## 2019-03-13 RX ADMIN — Medication 400 MILLIGRAM(S): at 08:58

## 2019-03-13 RX ADMIN — HEPARIN SODIUM 7500 UNIT(S): 5000 INJECTION INTRAVENOUS; SUBCUTANEOUS at 09:17

## 2019-03-13 NOTE — DISCHARGE NOTE PROVIDER - HOSPITAL COURSE
This is a 27y/o male presents in [facility] for elective surgery. pt underwent Laparoscopic gastric bypass and Linn-en-Y gastroenterostomy on 03/11/2019 . Post op pt admitted to ICU for close monitoring.    pt treated medically and diet advanced per bariatric protocol. pt was downgraded to floor    On 3/13/19 pt without complaints tolerated bariatric diet and ambulated. vital signs stable and afebrile .Per Dr Kruse pt discharged in stable condition.    pt advised to follow up with Dr Kruse as scheduled and resume medication per bariatric. precaution provided. This is a 27y/o male presents in [facility] for elective surgery. pt underwent Laparoscopic gastric bypass and Linn-en-Y gastroenterostomy on 03/11/2019 . Post op pt admitted to ICU for close monitoring.    pt treated medically and diet advanced per bariatric protocol.     pt doing well and  was downgraded to floor    On 3/13/19 pt without complaints tolerated bariatric diet and ambulated. vital signs stable and afebrile .Per Dr Kruse pt discharged in stable condition.    pt advised to follow up with Dr Kruse as scheduled and resume medication per bariatric. precaution provided.

## 2019-03-13 NOTE — DISCHARGE NOTE PROVIDER - NSDCCPTREATMENT_GEN_ALL_CORE_FT
PRINCIPAL PROCEDURE  Procedure: Laparoscopic gastric bypass  Findings and Treatment: laparoscopic anish en Y gastric by pass

## 2019-03-13 NOTE — CHART NOTE - NSCHARTNOTEFT_GEN_A_CORE
pt seen without complaints. tolerated diet and ambulated. vital signs stable and afebrile. Per Dr Kruse pt can be discharged home today. pt advised to follow up with DR Kruse as scheduled .  resume medications per bariatric instruction. precaution provided  All risks, benefits explained and all questions answered @ this time  pt showed understanding.

## 2019-03-13 NOTE — PROGRESS NOTE ADULT - ATTENDING COMMENTS
Patient clinically stable.  Tolerating liquids at this time. Continue current postop care.
Patient clinically stable.  Incisional pain improved with toradol x 1.
Assessment and plan above were modified and discussed with residents, physician assistants, and nurses.

## 2019-03-13 NOTE — DISCHARGE NOTE PROVIDER - NSDCFUADDINST_GEN_ALL_CORE_FT
follow up with Dr Kruse as scheduled  Resume home medications as per Bariatric instructions  keep wound clean and dry  no tub bath  if experience fever, shortness of breath, chest pain, dizziness bleeding or drainage from wound call MD or return to ED

## 2019-03-13 NOTE — PROGRESS NOTE ADULT - SUBJECTIVE AND OBJECTIVE BOX
Progress Note: Surgery  Patient: GABY CERDA , 28y (1990)Male   MRN: [id number]  Location: [place] [address] A  Visit: 03-11-19 Inpatient  Date: 03-13-19 @ 05:32    Hospital Day: 3    Post-op Day: 2    Procedure/Injury: s/p laparoscopic RNY gastric bypass    Events over 24h: patient doing well, ambulating, pulling 1500 on IS, still mildly tachycardic 90-100s but otherwise doing well     Vitals: T(F): 98.9 (03-13-19 @ 00:00), Max: 98.9 (03-13-19 @ 00:00)  HR: 99 (03-13-19 @ 00:00)  BP: 126/82 (03-13-19 @ 00:00) (114/66 - 146/82)  RR: 18 (03-13-19 @ 00:15)  SpO2: 94% (03-13-19 @ 00:15)    Diet: Diet, Clear Liquid:   Bariatric Clear Liquid (BARICLLIQ) (03-12-19 @ 06:55)    Out:   03-11-19 @ 07:01  -  03-12-19 @ 07:00  --------------------------------------------------------  OUT:    Indwelling Catheter - Urethral: 1080 mL    Voided: 400 mL  Total OUT: 1480 mL      03-12-19 @ 07:01  -  03-13-19 @ 05:32  --------------------------------------------------------  OUT:    Voided: 450 mL  Total OUT: 450 mL    Physical Examination:  General: NAD, lying in bed comfortably   HEENT: NCAT, ZULEYMA, WNL  Heart: S1 S2, No MRG RRR   Lungs: CTABL +BS Equal BL, No WRC  Abdomen: Soft, non-distended, tender near incisions  Incisions/Wounds: Dressings in place, clean, dry and intact, no signs of infection/active bleeding/drainage    Medications: [Standing]  cefoTEtan  IVPB 2 Gram(s) IV Intermittent every 12 hours  heparin  Injectable 7500 Unit(s) SubCutaneous every 8 hours  ondansetron Injectable 4 milliGRAM(s) IV Push every 8 hours  pantoprazole  Injectable 40 milliGRAM(s) IV Push every 24 hours    Medications:[PRN]  naloxone Injectable 0.1 milliGRAM(s) IV Push every 3 minutes PRN  ondansetron Injectable 4 milliGRAM(s) IV Push every 6 hours PRN  oxyCODONE    5 mG/acetaminophen 325 mG 1 Tablet(s) Oral every 6 hours PRN    Labs:                        13.4   7.26  )-----------( 312      ( 13 Mar 2019 01:37 )             43.0     03-13    138  |  102  |  9<L>  ----------------------------<  91  4.8   |  19  |  1.2    Ca    8.7      13 Mar 2019 01:37  Phos  2.5     03-13  Mg     1.8     03-13    Date/Time: 03-13-19 @ 05:32

## 2019-03-13 NOTE — DISCHARGE NOTE NURSING/CASE MANAGEMENT/SOCIAL WORK - NSDCDPATPORTLINK_GEN_ALL_CORE
You can access the [facility] Patient Portal, offered by [facility], by registering with the following website: [email or web address]

## 2019-03-13 NOTE — PROGRESS NOTE ADULT - ASSESSMENT
Assessment:  28y Male patient s/p laparoscopic RNY gastric bypass    Plan:  - continue bariatric phase 1   - continue crushed percocet   - IVL  - DVT & GI PPX  - Encourage Ambulation and IS Assessment:  28y Male patient s/p laparoscopic RNY gastric bypass    Plan:  - continue bariatric phase 1   - continue crushed percocet   - DVT & GI PPX  - Encourage Ambulation and IS

## 2019-03-13 NOTE — DISCHARGE NOTE PROVIDER - CARE PROVIDER_API CALL
Abigail Kruse)  Surgery  [address] [place] [address], [address] [place] [address]  Phone: [phone]  Fax: [phone]  Follow Up Time:

## 2019-03-15 ENCOUNTER — OTHER (OUTPATIENT)
Age: 29
End: 2019-03-15

## 2019-03-15 DIAGNOSIS — I10 ESSENTIAL (PRIMARY) HYPERTENSION: ICD-10-CM

## 2019-03-15 DIAGNOSIS — E66.01 MORBID (SEVERE) OBESITY DUE TO EXCESS CALORIES: ICD-10-CM

## 2019-03-15 DIAGNOSIS — G47.33 OBSTRUCTIVE SLEEP APNEA (ADULT) (PEDIATRIC): ICD-10-CM

## 2019-03-15 DIAGNOSIS — R00.0 TACHYCARDIA, UNSPECIFIED: ICD-10-CM

## 2019-03-15 DIAGNOSIS — G89.18 OTHER ACUTE POSTPROCEDURAL PAIN: ICD-10-CM

## 2019-03-15 DIAGNOSIS — Z79.82 LONG TERM (CURRENT) USE OF ASPIRIN: ICD-10-CM

## 2019-03-15 DIAGNOSIS — F32.9 MAJOR DEPRESSIVE DISORDER, SINGLE EPISODE, UNSPECIFIED: ICD-10-CM

## 2019-03-15 DIAGNOSIS — M54.5 LOW BACK PAIN: ICD-10-CM

## 2019-03-15 DIAGNOSIS — G89.29 OTHER CHRONIC PAIN: ICD-10-CM

## 2019-03-15 DIAGNOSIS — F41.9 ANXIETY DISORDER, UNSPECIFIED: ICD-10-CM

## 2019-03-20 ENCOUNTER — APPOINTMENT (OUTPATIENT)
Dept: SURGERY | Facility: CLINIC | Age: 29
End: 2019-03-20
Payer: MEDICAID

## 2019-03-20 VITALS — BODY MASS INDEX: 48.86 KG/M2 | WEIGHT: 315 LBS | HEIGHT: 67.5 IN

## 2019-03-20 PROCEDURE — 99024 POSTOP FOLLOW-UP VISIT: CPT

## 2019-03-20 RX ORDER — CALCIUM CARBONATE/VITAMIN D3 500 MG-2.5
TABLET,CHEWABLE ORAL DAILY
Refills: 0 | Status: ACTIVE | COMMUNITY

## 2019-03-20 RX ORDER — ACETAMINOPHEN AND CODEINE 300; 30 MG/1; MG/1
300-30 TABLET ORAL EVERY 6 HOURS
Qty: 8 | Refills: 0 | Status: DISCONTINUED | COMMUNITY
Start: 2019-02-20 | End: 2019-03-20

## 2019-03-20 NOTE — PHYSICAL EXAM
[de-identified] : Incisions dry and clean with no evidence of erythema. [de-identified] : Soft, nondistended.

## 2019-03-20 NOTE — ASSESSMENT
[Previous Visit Weight: ___] : Previous visit weight: [unfilled] lbs [___ Days Post Op] : [unfilled] days [Today's Weight: ___] : Today's weight:[unfilled] lbs [Today's BMI: ___] : Today's BMI: [unfilled] [de-identified] : GABY CERDA is a 28 year male seen today for Bariatric postoperative visit.  Patient states he feels well except for some minor discomfort at his right port site. Patient dis not advance his diet as recommended.  He thought that he had to wait until this visit.  He is drinking 2 protein shakes daily along with Jello. He is prepared and has eggs, yogurt and sugar free pudding at home.\par Fluid intake is adequate with mainly water, teas and Crystal Light.\par He is walking 10 minutes every 2 hours. Encouraged patient to walk outdoors. Reinforced no heavy lifting and pulling.\par Plan: Patient will call RD tomorrow to let her know how he is tolerating his diet. Return to office in 3 weeks.  Call with concerns.\par

## 2019-03-20 NOTE — HISTORY OF PRESENT ILLNESS
[Procedure: ___] : Procedure performed: [unfilled]  [Date of Surgery: ___] : Date of Surgery:   [unfilled] [Pre-Op Weight ___] : Pre-op weight was [unfilled] lbs [___ Days Post Op] : [unfilled] days  [Phase 1] : Phase 1 [de-identified] : Patient had surgery approximately 9 days ago. \par He denies heartburn/reflux/vomiting and food intolerance.\par He has not used his CPAP since surgery.  Discussed the risks of ANTOLIN and strongly encouraged compliance.

## 2019-03-20 NOTE — DATA REVIEWED
[FreeTextEntry1] : \par Wt. change since last visit: -15.8 lbs\par %EWL: 8\par TWL: 15.8 lbs\par BMI: 52\par \par

## 2019-04-05 ENCOUNTER — APPOINTMENT (OUTPATIENT)
Dept: SURGERY | Facility: CLINIC | Age: 29
End: 2019-04-05
Payer: MEDICAID

## 2019-04-05 VITALS — BODY MASS INDEX: 48.86 KG/M2 | HEIGHT: 67.5 IN | WEIGHT: 315 LBS

## 2019-04-05 DIAGNOSIS — R06.02 SHORTNESS OF BREATH: ICD-10-CM

## 2019-04-05 PROCEDURE — 99024 POSTOP FOLLOW-UP VISIT: CPT

## 2019-04-05 NOTE — DATA REVIEWED
[FreeTextEntry1] : \par Wt. change since last visit: -12.6 lbs\par %EWL: 14\par TWL: 28.4 lbs\par BMI: 50.5\par \par

## 2019-04-05 NOTE — HISTORY OF PRESENT ILLNESS
[Procedure: ___] : Procedure performed: [unfilled]  [Date of Surgery: ___] : Date of Surgery:   [unfilled] [Pre-Op Weight ___] : Pre-op weight was [unfilled] lbs [___ Months Post Op] : [unfilled] months [de-identified] : Patient had surgery approximately 1 month ago. He still gets fatigued with stairs. He c/o itching only when is sleeping.\par He denies heartburn/reflux.  He had 2 separate episodes of vomiting with tuna fish. He denies food intolerance.\par He is compliant with nightly CPAP use and his back pain improved.\par

## 2019-04-05 NOTE — ASSESSMENT
[FreeTextEntry1] : GABY CERDA is a 28 year male seen today for Bariatric postoperative visit. Patient is doing well. \par Patient is compliant with dietary guidelines.\par He eats 3 meals/day.  Breakfast - yogurt/cheese or a scrambled egg.  Lunch - meatball.  Dinner - chicken meatloaf or a meatball. He is drinking 1 protein shake daily. \par Fluid intake is adequate with mainly water and diet iced tea.\par He is walking for 20-30 minutes daily.  Reinforced no heavy lifting for 1 more month.\par \par

## 2019-04-05 NOTE — REASON FOR VISIT
[Post Operative Visit] : a post operative visit for [Morbid Obesity (BMI>40)] : morbid obesity (bmi>40) [FreeTextEntry2] : Sleeve Gastrectomy on 3/11/2019

## 2019-04-05 NOTE — PHYSICAL EXAM
[Normal] : affect appropriate [de-identified] : Soft, nondistended.  Incisions dry, clean and healing well

## 2019-04-05 NOTE — PLAN
[FreeTextEntry1] : PLAN:  Prescription for Actigall.\par             CMP for 1 month.\par             Return to office in 2 months with blood work.\par             Call with concerns.

## 2019-05-14 ENCOUNTER — OTHER (OUTPATIENT)
Age: 29
End: 2019-05-14

## 2019-06-07 ENCOUNTER — APPOINTMENT (OUTPATIENT)
Dept: SURGERY | Facility: CLINIC | Age: 29
End: 2019-06-07
Payer: MEDICAID

## 2019-06-07 VITALS — WEIGHT: 298.6 LBS | HEIGHT: 67.5 IN | BODY MASS INDEX: 46.32 KG/M2

## 2019-06-07 DIAGNOSIS — Z86.39 PERSONAL HISTORY OF OTHER ENDOCRINE, NUTRITIONAL AND METABOLIC DISEASE: ICD-10-CM

## 2019-06-07 PROCEDURE — 99024 POSTOP FOLLOW-UP VISIT: CPT

## 2019-06-10 ENCOUNTER — RX RENEWAL (OUTPATIENT)
Age: 29
End: 2019-06-10

## 2019-06-14 NOTE — DATA REVIEWED
[FreeTextEntry1] : \par Wt. change since last visit: -28.8 lbs\par %EWL: 28\par TWL: 57.2 lbs\par BMI: 46\par \par Blood work reviewed - he will decrease vitamin b 12 to daily and add a protein shake 3 days/week as his albumin 3.6\par \par

## 2019-06-14 NOTE — HISTORY OF PRESENT ILLNESS
[Procedure: ___] : Procedure performed: [unfilled]  [Date of Surgery: ___] : Date of Surgery:   [unfilled] [Pre-Op Weight ___] : Pre-op weight was [unfilled] lbs [___ Months Post Op] : [unfilled] months [de-identified] : Patient had surgery approximately 3 months ago. \par He denies heartburn/reflux/vomiting and food intolerance.\par He is only using his CPAP a few days/week.  Discussed the risks of ANTOLIN and strongly recommend compliance.

## 2019-06-14 NOTE — CONSULT LETTER
[Please see my note below.] : Please see my note below. [Courtesy Letter:] : I had the pleasure of seeing your patient, [unfilled], in my office today. [Sincerely,] : Sincerely, [Consult Closing:] : Thank you very much for allowing me to participate in the care of this patient.  If you have any questions, please do not hesitate to contact me. [Dear  ___] : Dear  [unfilled], [FreeTextEntry3] : Abigail Kruse MD, FACS, FASMBS\par Chief, General, Bariatric & Minimally Invasive Surgery\par Director, Quality & Performance Improvement\par Director, General Surgery Residency Program\par Director, Advanced GI MIS Fellowship\par Department of Surgery\par [facility] \par [facility]\par

## 2019-06-14 NOTE — ASSESSMENT
[FreeTextEntry1] : GABY CERDA is a 29 year male seen today for Bariatric postoperative visit.  He is doing well.\par Patient is compliant with dietary guidelines.\par He eats 3 meals/day.  Breakfast 1 egg with 1 turkey sausage.  Lunch - chicken breast or rolled turkey and cheese. Dinner - chicken breast, meatloaf or shrimp with an occasional serving of tortilla. Snack - a small serving of peanuts.  Recommend that he add softly cooked vegetables, mixed green salad and fruits. \par Fluid intake is adequate with mainly water.\par He is exercising daily on a stationary bike for 45 minutes and he also walk outdoors for 15-20 minutes.  He added weight bearing exercises.

## 2019-06-14 NOTE — REASON FOR VISIT
[Follow-Up Visit] : a follow-up visit for [Morbid Obesity (BMI>40)] : morbid obesity (bmi>40) [FreeTextEntry2] : RYGB on 3/11/2019

## 2019-06-14 NOTE — PLAN
[FreeTextEntry1] : PLAN:  Add protein shake as discussed.\par             Return to office in 3 months with blood work.\par             Add fruits and vegetables.\par             Call with concerns.

## 2019-09-03 ENCOUNTER — LABORATORY RESULT (OUTPATIENT)
Age: 29
End: 2019-09-03

## 2019-09-03 ENCOUNTER — OUTPATIENT (OUTPATIENT)
Dept: OUTPATIENT SERVICES | Facility: HOSPITAL | Age: 29
LOS: 1 days | Discharge: HOME | End: 2019-09-03

## 2019-09-03 DIAGNOSIS — E66.01 MORBID (SEVERE) OBESITY DUE TO EXCESS CALORIES: ICD-10-CM

## 2019-09-03 DIAGNOSIS — Z98.890 OTHER SPECIFIED POSTPROCEDURAL STATES: Chronic | ICD-10-CM

## 2019-09-06 ENCOUNTER — RESULT REVIEW (OUTPATIENT)
Age: 29
End: 2019-09-06

## 2019-09-13 ENCOUNTER — APPOINTMENT (OUTPATIENT)
Dept: SURGERY | Facility: CLINIC | Age: 29
End: 2019-09-13
Payer: MEDICAID

## 2019-09-13 VITALS — WEIGHT: 263.4 LBS | BODY MASS INDEX: 40.86 KG/M2 | HEIGHT: 67.5 IN

## 2019-09-13 DIAGNOSIS — Z87.39 PERSONAL HISTORY OF OTHER DISEASES OF THE MUSCULOSKELETAL SYSTEM AND CONNECTIVE TISSUE: ICD-10-CM

## 2019-09-13 PROCEDURE — 99213 OFFICE O/P EST LOW 20 MIN: CPT

## 2019-09-13 RX ORDER — ERGOCALCIFEROL 1.25 MG/1
1.25 MG CAPSULE, LIQUID FILLED ORAL
Qty: 5 | Refills: 3 | Status: DISCONTINUED | COMMUNITY
Start: 2018-08-06 | End: 2019-09-13

## 2019-09-13 RX ORDER — OMEPRAZOLE 40 MG/1
40 CAPSULE, DELAYED RELEASE ORAL
Qty: 30 | Refills: 2 | Status: DISCONTINUED | COMMUNITY
Start: 2019-02-20 | End: 2019-09-13

## 2019-09-13 NOTE — ASSESSMENT
[FreeTextEntry1] : GABY CERDA is a 29 year male seen today for Bariatric follow up visit. Patient is doing well with his weight loss goals.\par Patient is compliant with dietary guidelines.\par He eats 3 meals/day. Breakfast/Lunch and Dinner - Quesadilla with chicken, pork or beef or rolled up turkey.  Patient states that he enjoys prepping meals once/week and he has no problem eating the same foods. Denies taste fatigue. He includes vegetables in his meals which keeps him satisfied.  \par Fluid intake is adequate with mainly water.\par He is exercising 5-6 days/week on his stationary bike for 45.  Recommend adding weight bearing exercise.\par \par

## 2019-09-13 NOTE — REASON FOR VISIT
[Follow-Up Visit] : a follow-up visit for [Morbid Obesity (BMI<40)] : morbid obesity (bmi<40) [FreeTextEntry2] : RYGB on 3/11/2019

## 2019-09-13 NOTE — DATA REVIEWED
[FreeTextEntry1] : \par Wt. change since last visit: 35.2 lbs\par %EWL: 45\par TWL: 92.4 lbs\par BMI: 40\par \par Blood work reviewed - vitamin d was slightly below normal value. Recommend adding vitamin d 2000 units daily and decrease mvi to one daily \par \par

## 2019-09-13 NOTE — PLAN
[FreeTextEntry1] : PLAN:  RTO in 3 months with blood work.\par             Use CPAP.\par             Add weight bearing exercises.\par             Add vitamin d 2000 units daily and decrease mvi to 1 daily.\par             Call with concerns.

## 2019-09-13 NOTE — HISTORY OF PRESENT ILLNESS
[Procedure: ___] : Procedure performed: [unfilled]  [Date of Surgery: ___] : Date of Surgery:   [unfilled] [Pre-Op Weight ___] : Pre-op weight was [unfilled] lbs [___ Months Post Op] : [unfilled] months [de-identified] : Patient had surgery approximately 6 months ago. \par He denies heartburn/reflux/vomiting and food intolerance.\par He is not using his CPAP machine.  Discussed the risks of ANTOLIN and strongly encouraged compliance.

## 2019-12-03 ENCOUNTER — LABORATORY RESULT (OUTPATIENT)
Age: 29
End: 2019-12-03

## 2019-12-03 ENCOUNTER — OUTPATIENT (OUTPATIENT)
Dept: OUTPATIENT SERVICES | Facility: HOSPITAL | Age: 29
LOS: 1 days | Discharge: HOME | End: 2019-12-03

## 2019-12-03 DIAGNOSIS — Z98.890 OTHER SPECIFIED POSTPROCEDURAL STATES: Chronic | ICD-10-CM

## 2019-12-03 DIAGNOSIS — E55.9 VITAMIN D DEFICIENCY, UNSPECIFIED: ICD-10-CM

## 2019-12-03 DIAGNOSIS — D50.1 SIDEROPENIC DYSPHAGIA: ICD-10-CM

## 2019-12-03 DIAGNOSIS — E66.01 MORBID (SEVERE) OBESITY DUE TO EXCESS CALORIES: ICD-10-CM

## 2019-12-03 DIAGNOSIS — K91.2 POSTSURGICAL MALABSORPTION, NOT ELSEWHERE CLASSIFIED: ICD-10-CM

## 2019-12-03 DIAGNOSIS — E78.89 OTHER LIPOPROTEIN METABOLISM DISORDERS: ICD-10-CM

## 2019-12-03 DIAGNOSIS — D50.8 OTHER IRON DEFICIENCY ANEMIAS: ICD-10-CM

## 2019-12-09 ENCOUNTER — RESULT REVIEW (OUTPATIENT)
Age: 29
End: 2019-12-09

## 2019-12-09 RX ORDER — NICOTINE 11MG/24HR
50 MCG PATCH, TRANSDERMAL 24 HOURS TRANSDERMAL DAILY
Refills: 0 | Status: ACTIVE | COMMUNITY

## 2019-12-09 RX ORDER — URSODIOL 300 MG/1
300 CAPSULE ORAL
Qty: 60 | Refills: 5 | Status: DISCONTINUED | COMMUNITY
Start: 2019-04-05 | End: 2019-12-09

## 2019-12-18 ENCOUNTER — APPOINTMENT (OUTPATIENT)
Dept: SURGERY | Facility: CLINIC | Age: 29
End: 2019-12-18
Payer: MEDICAID

## 2019-12-18 VITALS — HEIGHT: 67.5 IN | BODY MASS INDEX: 35.31 KG/M2 | WEIGHT: 227.6 LBS

## 2019-12-18 DIAGNOSIS — E66.01 MORBID (SEVERE) OBESITY DUE TO EXCESS CALORIES: ICD-10-CM

## 2019-12-18 PROCEDURE — 99213 OFFICE O/P EST LOW 20 MIN: CPT

## 2019-12-20 NOTE — HISTORY OF PRESENT ILLNESS
[Procedure: ___] : Procedure performed: [unfilled]  [Date of Surgery: ___] : Date of Surgery:   [unfilled] [Pre-Op Weight ___] : Pre-op weight was [unfilled] lbs [___ Months Post Op] : [unfilled] months [de-identified] : Patient had surgery approximately 9 months ago. \par He denies heartburn/reflux/vomiting and food intolerance.\par He is still not using his CPAP machine.  I again discussed the risk of ANTOLIN with patient and strongly encouraged compliance.  He will follow up with pulmonary as tram feels that the pressure need to be adjusted.

## 2019-12-20 NOTE — PLAN
[FreeTextEntry1] : PLAN:  Continue with diet and exercise.\par             Follow up with pulmonary.\par             RTO in 3 months with blood work.\par             Call with concerns.

## 2019-12-20 NOTE — DATA REVIEWED
[FreeTextEntry1] : \par Wt. change since last visit: -35.8 lbs\par %EWL: 63\par TWL: 128. 2 lbs\par BMI: 35\par \par

## 2019-12-20 NOTE — ADDENDUM
[FreeTextEntry1] : Plan of care reviewed with practitioner and I concur with the assessment; note amended as appropriate.\par

## 2019-12-20 NOTE — ASSESSMENT
[FreeTextEntry1] : GABY CERDA is a 29 year male seen today for Bariatric follow up visit. Patient is doing well with his weight loss goals.\par Patient is compliant with dietary guidelines. He keeps his diet the same as he feels that it works well for him.d\par He eats 3 meals/day. Breakfast/Lunch and Dinner - Quesadilla with chicken, pork or beef or rolled up turkey. Patient states that he enjoys prepping meals once/week and he has no problem eating the same foods. Denies taste fatigue. He includes vegetables in his meals which keeps him satisfied. \par Fluid intake is adequate with mainly water or 1 cup of green tea. .\par He is exercising 5-6 days/week on his stationary bike for 45. He added weight bearing exercises\par

## 2020-03-17 ENCOUNTER — NON-APPOINTMENT (OUTPATIENT)
Age: 30
End: 2020-03-17

## 2020-03-17 RX ORDER — PEDI MULTIVIT NO.25/FOLIC ACID 300 MCG
60 TABLET,CHEWABLE ORAL DAILY
Refills: 0 | Status: ACTIVE | COMMUNITY

## 2020-03-18 ENCOUNTER — APPOINTMENT (OUTPATIENT)
Dept: SURGERY | Facility: CLINIC | Age: 30
End: 2020-03-18

## 2020-06-01 ENCOUNTER — OUTPATIENT (OUTPATIENT)
Dept: OUTPATIENT SERVICES | Facility: HOSPITAL | Age: 30
LOS: 1 days | End: 2020-06-01
Payer: MEDICAID

## 2020-06-01 DIAGNOSIS — Z98.890 OTHER SPECIFIED POSTPROCEDURAL STATES: Chronic | ICD-10-CM

## 2020-06-15 PROCEDURE — G9001: CPT

## 2020-07-06 DIAGNOSIS — Z71.89 OTHER SPECIFIED COUNSELING: ICD-10-CM

## 2020-08-25 ENCOUNTER — APPOINTMENT (OUTPATIENT)
Dept: SURGERY | Facility: CLINIC | Age: 30
End: 2020-08-25
Payer: MEDICAID

## 2020-08-25 VITALS — WEIGHT: 198 LBS | BODY MASS INDEX: 30.71 KG/M2 | TEMPERATURE: 97.8 F | HEIGHT: 67.5 IN

## 2020-08-25 PROCEDURE — 99213 OFFICE O/P EST LOW 20 MIN: CPT

## 2020-08-25 NOTE — HISTORY OF PRESENT ILLNESS
[Date of Surgery: ___] : Date of Surgery:   [unfilled] [Procedure: ___] : Procedure performed: [unfilled]  [___ Years Post Op] : [unfilled] years [Pre-Op Weight ___] : Pre-op weight was [unfilled] lbs [de-identified] : Patient had surgery approximately 1 1/2 years ago. \par He denies heartburn/reflux/vomiting and food intolerance.\par He is still not using his CPAP machine. I again discussed the risk of ANTOLIN with patient and strongly encouraged patient to follow up with pulmonary. \par

## 2020-08-25 NOTE — ASSESSMENT
[FreeTextEntry1] : GABY CERDA is a 30 year male seen today for Bariatric follow up visit. Patient is doing well with his weight loss goals.\par Patient is compliant with dietary guidelines. He continues to prep his meals once/week and states that he has no problem eating the same foods.\par He eats 3 meals/day. Breakfast/Lunch and Dinner - Quesadilla with chicken, pork or beef or rolled up turkey with plenty of green leafy vegetables. Snacks - fruits or nuts.\par Fluid intake is with mainly water.  He is averaging ~ 42 oz. Recommend that he increase his fluid intake to 64 oz. day.\par He is exercising 5 days/week on his stationary bike for 45 and he added weight bearing exercise.\par \par

## 2020-08-25 NOTE — REASON FOR VISIT
[Follow-Up Visit] : a follow-up visit for [S/P Bariatric Surgery] : s/p bariatric surgery [FreeTextEntry2] : RYGB on 3/11/2019

## 2020-08-25 NOTE — DATA REVIEWED
[FreeTextEntry1] : Wt. change since last visit: -29.6 lbs\par %EWL: 77\par TWL: 157.8 lbs\par BMI: 30\par \par \par \par

## 2020-08-25 NOTE — PLAN
[FreeTextEntry1] : PLAN: Continue with diet and exercise.\par            RTO in 46 months.\par            Blood work.\par            Follow up with pulmonary.\par            Call with concerns.

## 2021-02-05 ENCOUNTER — EMERGENCY (EMERGENCY)
Facility: HOSPITAL | Age: 31
LOS: 0 days | Discharge: HOME | End: 2021-02-06
Attending: EMERGENCY MEDICINE | Admitting: EMERGENCY MEDICINE
Payer: MEDICAID

## 2021-02-05 VITALS
HEIGHT: 67 IN | WEIGHT: 160.06 LBS | HEART RATE: 78 BPM | SYSTOLIC BLOOD PRESSURE: 164 MMHG | TEMPERATURE: 98 F | DIASTOLIC BLOOD PRESSURE: 94 MMHG | RESPIRATION RATE: 19 BRPM | OXYGEN SATURATION: 98 %

## 2021-02-05 DIAGNOSIS — R41.82 ALTERED MENTAL STATUS, UNSPECIFIED: ICD-10-CM

## 2021-02-05 DIAGNOSIS — E66.9 OBESITY, UNSPECIFIED: ICD-10-CM

## 2021-02-05 DIAGNOSIS — Z98.890 OTHER SPECIFIED POSTPROCEDURAL STATES: Chronic | ICD-10-CM

## 2021-02-05 DIAGNOSIS — F41.8 OTHER SPECIFIED ANXIETY DISORDERS: ICD-10-CM

## 2021-02-05 DIAGNOSIS — T40.8X1A POISONING BY LYSERGIDE [LSD], ACCIDENTAL (UNINTENTIONAL), INITIAL ENCOUNTER: ICD-10-CM

## 2021-02-05 DIAGNOSIS — Z79.82 LONG TERM (CURRENT) USE OF ASPIRIN: ICD-10-CM

## 2021-02-05 DIAGNOSIS — Z79.899 OTHER LONG TERM (CURRENT) DRUG THERAPY: ICD-10-CM

## 2021-02-05 PROCEDURE — 99284 EMERGENCY DEPT VISIT MOD MDM: CPT

## 2021-02-05 NOTE — ED ADULT TRIAGE NOTE - CHIEF COMPLAINT QUOTE
Pt came c/o taking LSD, not answering questions, has bizarre behavior in triage, but denies suicidal ideations.

## 2021-02-06 LAB
ALBUMIN SERPL ELPH-MCNC: 4.7 G/DL — SIGNIFICANT CHANGE UP (ref 3.5–5.2)
ALP SERPL-CCNC: 75 U/L — SIGNIFICANT CHANGE UP (ref 30–115)
ALT FLD-CCNC: 24 U/L — SIGNIFICANT CHANGE UP (ref 0–41)
ANION GAP SERPL CALC-SCNC: 20 MMOL/L — HIGH (ref 7–14)
APAP SERPL-MCNC: <5 UG/ML — LOW (ref 10–30)
AST SERPL-CCNC: 22 U/L — SIGNIFICANT CHANGE UP (ref 0–41)
BASOPHILS # BLD AUTO: 0.03 K/UL — SIGNIFICANT CHANGE UP (ref 0–0.2)
BASOPHILS NFR BLD AUTO: 0.4 % — SIGNIFICANT CHANGE UP (ref 0–1)
BILIRUB SERPL-MCNC: 1.5 MG/DL — HIGH (ref 0.2–1.2)
BUN SERPL-MCNC: 15 MG/DL — SIGNIFICANT CHANGE UP (ref 10–20)
CALCIUM SERPL-MCNC: 9.9 MG/DL — SIGNIFICANT CHANGE UP (ref 8.5–10.1)
CHLORIDE SERPL-SCNC: 102 MMOL/L — SIGNIFICANT CHANGE UP (ref 98–110)
CO2 SERPL-SCNC: 17 MMOL/L — SIGNIFICANT CHANGE UP (ref 17–32)
CREAT SERPL-MCNC: 1.1 MG/DL — SIGNIFICANT CHANGE UP (ref 0.7–1.5)
EOSINOPHIL # BLD AUTO: 0.02 K/UL — SIGNIFICANT CHANGE UP (ref 0–0.7)
EOSINOPHIL NFR BLD AUTO: 0.2 % — SIGNIFICANT CHANGE UP (ref 0–8)
ETHANOL SERPL-MCNC: <10 MG/DL — SIGNIFICANT CHANGE UP
GLUCOSE SERPL-MCNC: 144 MG/DL — HIGH (ref 70–99)
HCT VFR BLD CALC: 48.9 % — SIGNIFICANT CHANGE UP (ref 42–52)
HGB BLD-MCNC: 17.3 G/DL — SIGNIFICANT CHANGE UP (ref 14–18)
IMM GRANULOCYTES NFR BLD AUTO: 0.2 % — SIGNIFICANT CHANGE UP (ref 0.1–0.3)
LYMPHOCYTES # BLD AUTO: 1.18 K/UL — LOW (ref 1.2–3.4)
LYMPHOCYTES # BLD AUTO: 14 % — LOW (ref 20.5–51.1)
MCHC RBC-ENTMCNC: 30.1 PG — SIGNIFICANT CHANGE UP (ref 27–31)
MCHC RBC-ENTMCNC: 35.4 G/DL — SIGNIFICANT CHANGE UP (ref 32–37)
MCV RBC AUTO: 85 FL — SIGNIFICANT CHANGE UP (ref 80–94)
MONOCYTES # BLD AUTO: 0.43 K/UL — SIGNIFICANT CHANGE UP (ref 0.1–0.6)
MONOCYTES NFR BLD AUTO: 5.1 % — SIGNIFICANT CHANGE UP (ref 1.7–9.3)
NEUTROPHILS # BLD AUTO: 6.72 K/UL — HIGH (ref 1.4–6.5)
NEUTROPHILS NFR BLD AUTO: 80.1 % — HIGH (ref 42.2–75.2)
NRBC # BLD: 0 /100 WBCS — SIGNIFICANT CHANGE UP (ref 0–0)
PLATELET # BLD AUTO: 184 K/UL — SIGNIFICANT CHANGE UP (ref 130–400)
POTASSIUM SERPL-MCNC: 3.8 MMOL/L — SIGNIFICANT CHANGE UP (ref 3.5–5)
POTASSIUM SERPL-SCNC: 3.8 MMOL/L — SIGNIFICANT CHANGE UP (ref 3.5–5)
PROT SERPL-MCNC: 7.4 G/DL — SIGNIFICANT CHANGE UP (ref 6–8)
RBC # BLD: 5.75 M/UL — SIGNIFICANT CHANGE UP (ref 4.7–6.1)
RBC # FLD: 11.9 % — SIGNIFICANT CHANGE UP (ref 11.5–14.5)
SALICYLATES SERPL-MCNC: <0.3 MG/DL — LOW (ref 4–30)
SODIUM SERPL-SCNC: 139 MMOL/L — SIGNIFICANT CHANGE UP (ref 135–146)
WBC # BLD: 8.4 K/UL — SIGNIFICANT CHANGE UP (ref 4.8–10.8)
WBC # FLD AUTO: 8.4 K/UL — SIGNIFICANT CHANGE UP (ref 4.8–10.8)

## 2021-02-06 RX ORDER — SODIUM CHLORIDE 9 MG/ML
1000 INJECTION, SOLUTION INTRAVENOUS ONCE
Refills: 0 | Status: DISCONTINUED | OUTPATIENT
Start: 2021-02-06 | End: 2021-02-06

## 2021-02-06 RX ADMIN — Medication 2 MILLIGRAM(S): at 00:25

## 2021-02-06 NOTE — ED ADULT NURSE NOTE - OBJECTIVE STATEMENT
PT is a 30y Male with c/o of altered mental status. Pt admits to taking LSD tonight. Pt making unusual statements and not answering questions. Pt goes off on tangents that don't make sense. Pt does state he does not want to harm self or others. Pt mother at bedside for comfort of patient.

## 2021-02-06 NOTE — ED ADULT NURSE NOTE - SKIN CAPILLARY REFILL
g topically 4 times daily as needed 1 Tube 5    guaiFENesin 400 MG tablet Take 400 mg by mouth daily       OXYGEN Inhale  into the lungs. 2 liters per nasal cannula at hs      ascorbic acid (VITAMIN C) 500 MG tablet Take 500 mg by mouth daily.  B Complex Vitamins (B-COMPLEX/B-12 PO) Take 1 tablet by mouth daily. 2500mcg a day      Calcium Carbonate-Vitamin D (CALTRATE 600+D PO) Take 0.5 tablets by mouth daily       mupirocin (BACTROBAN) 2 % ointment Apply topically 3 times daily. 15 g 0     No current facility-administered medications for this encounter. Allergies  Asa [aspirin]    Family History  family history includes Cancer in his sister; Diabetes in his mother, sister, and sister; High Blood Pressure in his mother and sister; Pacemaker in his maternal aunt; Stroke in his maternal aunt. Objective Information:  Vitals: /70   Pulse 84   Temp 98.9 °F (37.2 °C)   Resp 20   Ht 5' 4.5\" (1.638 m)   Wt 118 lb (53.5 kg)   SpO2 93%   BMI 19.94 kg/m² , Body mass index is 19.94 kg/m².     Physical Exam  General appearance: no acute distress  Head: NCAT, EOMI  SKIN: Warm, Dry   Musculoskeletal: ambulatory per self, steady gait   Muscles tight in bilateral shoulders with palpation  Neurologic: alert and oriented X 3, speech clear  Mood and affect: appropriate, no SI or HI     UDS:  Lab Results   Component Value Date    BARBITURATES Negative 04/26/2017    BENZODIAZURI Negative 04/26/2017    OPIAU Negative 04/26/2017    OXYCOOXYMO Negative 04/26/2017    PHENCYCU Negative 04/26/2017    LABMETH Negative 04/26/2017    PPXUR Negative 04/26/2017    MEPERIDU Negative 04/26/2017    FENTU Negative 04/26/2017    ETHUQN Negative 04/26/2017    CANNANBINURI Negative 04/26/2017    AMPHETUR Negative 04/26/2017    COCAINEMETUR Negative 04/26/2017       Physical therapy during the last 6 months: none    Injections for pain control discussed: yes    ASSESSMENT  Patient Active Problem List   Diagnosis    Hypertension    Hypothyroidism    Osteoporosis    CKD (chronic kidney disease), stage II    Vitamin D deficiency    Lumbar spine pain    Cervical spine disease    COPD (chronic obstructive pulmonary disease) (HCC)    Anxiety    Depression    OA (osteoarthritis of spine)    Neck and shoulder pain    Frequent headaches    Chronic daily headache    Chronic headache    Chronic daily headache    Chronic daily headache    Chronic daily headache    Chronic daily headache    Mixed hyperlipidemia        PLAN  1. Patient is to call with any questions or concerns which may arise prior to the next office visit   2. Continue current dosage of Norco per MICHELLE fill date   3. Schedule patient for Cervical Trigger points and ONB's with Botox A no sedation    Discussion:    Education Provided:  [x] Review of Jessica Smith [x] Agreement Review [] Compliance Issues Discussed   [] Cognitive Behavior Needs [x] Exercise [] Review of Test [] Financial Issues  [] Tobacco/Alcohol Use [x] Teaching [] New Patient [] Picture Obtained    Controlled Substance Monitoring:   Discussed with patient possible medication side effects, risk of tolerance and/or dependence, and alternative treatments. Discussed the growing epidemic in the [address] [place] [address] with the overprescribing and at times the abuse of narcotics. Discussed the detrimental effects of long term narcotic use in younger patients. Patient encouraged to set daily goals of exercising and decreasing daily narcotic intake. Discussed with the patient about the development of hyperalgesia with long term narcotic intake.      Electronically signed by GELY Borges on 10/10/2017 at 2:46 PM 2 seconds or less

## 2021-02-06 NOTE — ED PROVIDER NOTE - NS ED ROS FT
Eyes:  No visual changes, eye pain or discharge.  ENMT:  No hearing changes, pain, no sore throat or runny nose, no difficulty swallowing  Cardiac:  No chest pain, SOB or edema. No chest pain with exertion.  Respiratory:  No cough or respiratory distress. No hemoptysis. No history of asthma or RAD.  GI:  No nausea, vomiting, diarrhea or abdominal pain.  :  No dysuria, frequency or burning.  MS:  No myalgia, muscle weakness, joint pain or back pain.  Neuro: see HPI  Skin:  No skin rash.   Endocrine: No history of thyroid disease or diabetes.

## 2021-02-06 NOTE — ED PROVIDER NOTE - NSFOLLOWUPINSTRUCTIONS_ED_ALL_ED_FT
Hallucinogen Use Disorder    Hallucinogens are a group of drugs that cause people to see and hear things that are not there (hallucinations). These drugs may be made from chemicals (synthetic) or may occur naturally. Examples of hallucinogens include:  •MDMA (Ecstasy).      •PCP.      •LSD.      •Psilocybin mushrooms (shrooms or magic mushrooms).      •Peyote.    In addition to hallucinations, people who use these drugs may experience:  •A feeling of being  from their body or mind.      •Lost awareness of time and place.      •Paranoia.      •Panic.      •Increased body temperature, blood pressure, and breathing.    Hallucinogen use disorder is long-term use of hallucinogens that interferes with normal life activities. The condition can cause health problems, such as:  •Memory loss.      •Anxiety, depression, paranoia, and thoughts of suicide (suicide ideation).      •Speech difficulty.      •Flashbacks.        What are the causes?  This condition may develop due to many complex social, psychological, or physical reasons, such as:  •Stress.      •Abuse.      •Peer pressure.      •Anxiety or depression.        What increases the risk?  The following factors may make you more likely to develop this condition:  •Using substances to cope with stress.      •Having been abused.      •Having a mental health disorder, such as depression.      •Family history of substance use disorder.        What are the signs or symptoms?  Symptoms of this condition include:  •Constantly wanting to use the drug.      •Being unable to slow down or stop your use of the drug.      •Spending an abnormal amount of time getting the drug, using the drug, or recovering from using the drug.      •Hallucinogen use that interferes with work, school, social activities, and personal relationships.    •Using the drug even after having negative consequences, such as:  •Health problems.      •Legal or financial troubles.      •Job loss.      •Broken relationships.        •Needing more and more of the drug to get the same effect (developing tolerance).    •Experiencing unpleasant symptoms if you do not use the drug (withdrawal). Signs of withdrawal include:  •Headache.      •Fatigue.      •Depression.        •Using the drug to avoid withdrawal.        How is this diagnosed?  This condition is diagnosed based on:  •A physical exam.      •Your history of substance abuse.    •Your symptoms. This includes:  •How hallucinogen use affects your life.      •Changes in personality, behaviors, and mood.      •Having at least two symptoms of hallucinogen use disorder within a 12-month period.      •Health issues related to using hallucinogens.        •Blood or urine tests to screen for drugs.        How is this treated?  If you take too much of a drug at one time, you may need treatment in a hospital or a treatment center for your safety. Long-term treatment of hallucinogen use disorder may include:  •Group and individual counseling from mental health providers who help people with hallucinogen use disorder.      •Staying at a live-in (residential) treatment center for several days or weeks.      •Medicines to treat depression or anxiety.      •Support groups with others who are going through the same thing.      Recovery can be a long process. Many people who undergo treatment start using the drug again after stopping (relapse). If you relapse, it does not mean that treatment will not work.      Follow these instructions at home:    •Take over-the-counter and prescription medicines only as told by your health care provider.      • Do not use any drugs or alcohol.      •Avoid situations where drugs or alcohol are available, or you will be tempted to use the drug.    •Find healthy ways to cope with stress. This may include:  •Deep breathing, meditation, or exercise.       •Listening to music or doing an artistic hobby.      •Talking with someone you trust.        •Attend support groups as needed. These groups are an important part of long-term recovery for many people.      •Keep all follow-up visits as told by your health care providers. This is important. This includes continuing to work with therapists and support groups.        Contact a health care provider if:    •Your symptoms get worse or you develop new symptoms.      •You have trouble resisting the urge to use drugs.      •You continue to have hallucinations after stopping use.      •You are not able to take medicines as told.        Get help right away if:    •You relapse.    •You have signs of a hallucinogen overdose such as:  •Shaking.      •Seizure.      •Chest pain.      •Dark urine.      •Sore muscles.        •You have serious thoughts about hurting yourself or someone else.    If you ever feel like you may hurt yourself or others, or have thoughts about taking your own life, get help right away. You can go to your nearest emergency department or call:   • Your local emergency services (911 in the [place]       • A suicide crisis helpline, such as the National Suicide Prevention Lifeline at 1-826.115.6756. This is open 24 hours a day.         Summary    •Hallucinogens are a group of drugs that cause people to see and hear things that are not there (hallucinations). These drugs may be made from chemicals (synthetic) or may occur naturally. Hallucinogen use disorder is long-term use of hallucinogens that interferes with normal life activities or causes health problems.      •Stopping drug use and taking part in group and individual counseling from mental health providers can help treat people with hallucinogen use disorder.      •Recovery can be a long process. Many people who undergo treatment start using the drug again after stopping (relapse). A relapse does not mean that treatment will not work.      •Attend support groups as needed. These groups are an important part of long-term recovery for many people.      This information is not intended to replace advice given to you by your health care provider. Make sure you discuss any questions you have with your health care provider.

## 2021-02-06 NOTE — ED PROVIDER NOTE - PATIENT PORTAL LINK FT
You can access the FollowMyHealth Patient Portal offered by [facility] by registering at the following website: [email or web address]. By joining [facility]’s FollowMyHealth portal, you will also be able to view your health information using other applications (apps) compatible with our system.

## 2021-02-06 NOTE — ED PROVIDER NOTE - OBJECTIVE STATEMENT
30y M pmh depression/anxiety presenting with drug intoxication. Pt endorses to taking LSD today, 1 tablet, no other medications. Denies alcohol. Denying pain. Endorsing to seeing things that aren't there. Mom bedside.

## 2021-02-06 NOTE — ED PROVIDER NOTE - ATTENDING CONTRIBUTION TO CARE
I personally evaluated the patient. I reviewed the Resident’s or Physician Assistant’s note (as assigned above), and agree with the findings and plan except as documented in my note.    Pt is a 29 y/o male with no pertinent medical history presents to ED for AMS after taking LSD today, is seeing things, laughing. No complaints of pain. Denies any other drugs or alcohol.    Constitutional: Well developed, well nourished. NAD.  Head: Normocephalic, atraumatic.  Eyes: PERRL. EOMI.  ENT: No nasal discharge. Mucous membranes moist.  Neck: Supple. Painless ROM.  Cardiovascular: Normal S1, S2. Regular rate and rhythm. No murmurs, rubs, or gallops.  Pulmonary: Normal respiratory rate and effort. Lungs clear to auscultation bilaterally. No wheezing, rales, or rhonchi.  Abdominal: Soft. Nondistended. Nontender. No rebound, guarding, rigidity.  Extremities. Pelvis stable. No lower extremity edema, symmetric calves.  Skin: No rashes, cyanosis.  Neuro: AAOx3. No focal neurological deficits.  Psych: Normal mood. Normal affect.

## 2021-02-06 NOTE — ED PROVIDER NOTE - CLINICAL SUMMARY MEDICAL DECISION MAKING FREE TEXT BOX
30 male here for hallucinations after taking 100 mcg of LSD he bought over the internet. Brought to ED with his mother. Had screening labs imaging supportive care medications and reevaluation, observation in ED, symptoms improved with management in ED, plan discussed with patient, will discharge with outpatient management and return and follow up instructions.

## 2021-02-06 NOTE — ED ADULT NURSE NOTE - NSIMPLEMENTINTERV_GEN_ALL_ED
Implemented All Fall Risk Interventions:  [place] to call system. Call bell, personal items and telephone within reach. Instruct patient to call for assistance. Room bathroom lighting operational. Non-slip footwear when patient is off stretcher. Physically safe environment: no spills, clutter or unnecessary equipment. Stretcher in lowest position, wheels locked, appropriate side rails in place. Provide visual cue, wrist band, yellow gown, etc. Monitor gait and stability. Monitor for mental status changes and reorient to person, place, and time. Review medications for side effects contributing to fall risk. Reinforce activity limits and safety measures with patient and family.

## 2021-02-06 NOTE — ED PROVIDER NOTE - PHYSICAL EXAMINATION
CONSTITUTIONAL: Intoxicated  HEAD: Normocephalic; atraumatic.  EYES: 4mm b/l reactive, round  ENT: No nasal discharge; airway clear.  NECK: Supple; non tender.  CARD: S1, S2 normal; no murmurs, gallops, or rubs. Regular rate and rhythm.   RESP: No wheezes, rales or rhonchi.  ABD: soft ntnd  EXT: Normal ROM.  No clubbing, cyanosis or edema.   LYMPH: No acute cervical adenopathy.  NEURO: Nonfocal

## 2021-02-06 NOTE — ED PROVIDER NOTE - CARE PROVIDER_API CALL
Jevon Villalobos  CRITICAL CARE MEDICINE  [address] [place] [address], [facility] [address]  [place] [address]  Phone: [phone]  Fax: [phone]  Follow Up Time:

## 2021-02-23 ENCOUNTER — APPOINTMENT (OUTPATIENT)
Dept: SURGERY | Facility: CLINIC | Age: 31
End: 2021-02-23
Payer: MEDICAID

## 2021-02-23 VITALS — BODY MASS INDEX: 31.02 KG/M2 | HEIGHT: 67.5 IN | TEMPERATURE: 97.8 F | WEIGHT: 200 LBS

## 2021-02-23 PROCEDURE — 99212 OFFICE O/P EST SF 10 MIN: CPT

## 2021-02-23 PROCEDURE — 99072 ADDL SUPL MATRL&STAF TM PHE: CPT

## 2021-02-23 NOTE — DATA REVIEWED
[FreeTextEntry1] : Wt. change since last visit: +2 lbs\par %EWL: 76\par TWL: 155.8 lbs\par BMI: 30\par \par \par

## 2021-02-23 NOTE — ASSESSMENT
[FreeTextEntry1] : GABY CERDA is a 30 year male seen today for Bariatric follow up visit. Patient is doing well with his weight loss goals. He weighs himself two days/week as that keeps him accountable for his exercise and dietary management. Weight fluctuates between 198 - 200 lbs. Patient is very motivated.\par Patient states that he is a creature of habit  and prepares his meals twice/week and eats the same foods as he tolerates them well. he gets very anxious with anxious.\par He eats 3 meals/day. Breakfast/Lunch and Dinner - [place] with either chicken, pork, beef or rolled up turkey with plenty of green leafy vegetables. Snacks - fruits or nuts which is extremely rare.\par He improved his fluid intake and is drinking over 64 ounces of water daily with an occasional glass of diet Snapple.\par He is exercising 7 days/week on his stationary bike for 30 minutes and he added weight bearing exercise.\par \par  \par

## 2021-02-23 NOTE — HISTORY OF PRESENT ILLNESS
[Procedure: ___] : Procedure performed: [unfilled]  [Date of Surgery: ___] : Date of Surgery:   [unfilled] [Pre-Op Weight ___] : Pre-op weight was [unfilled] lbs [___ Years Post Op] : [unfilled] years [de-identified] : Patient had surgery approximately 2 years ago. \par He denies heartburn/reflux/vomiting and food intolerance.\par He is still not using his CPAP machine. I again discussed the risk of ANTOLIN with patient and strongly encouraged patient to follow up with pulmonary. He was given the contact information for Dr. Villalobos.\par  \par

## 2021-02-23 NOTE — PLAN
[FreeTextEntry1] : Plan: Continue with diet and exercise.\par          Follow up with pulmonary.\par          RTO in 1 year.\par          Call with concerns.

## 2022-02-22 ENCOUNTER — APPOINTMENT (OUTPATIENT)
Dept: SURGERY | Facility: CLINIC | Age: 32
End: 2022-02-22
Payer: MEDICAID

## 2022-02-22 VITALS
OXYGEN SATURATION: 96 % | DIASTOLIC BLOOD PRESSURE: 82 MMHG | HEIGHT: 67.5 IN | HEART RATE: 112 BPM | BODY MASS INDEX: 32.73 KG/M2 | SYSTOLIC BLOOD PRESSURE: 131 MMHG | TEMPERATURE: 95.8 F | WEIGHT: 211 LBS

## 2022-02-22 DIAGNOSIS — F32.A ANXIETY DISORDER, UNSPECIFIED: ICD-10-CM

## 2022-02-22 DIAGNOSIS — G47.33 OBSTRUCTIVE SLEEP APNEA (ADULT) (PEDIATRIC): ICD-10-CM

## 2022-02-22 DIAGNOSIS — F41.9 ANXIETY DISORDER, UNSPECIFIED: ICD-10-CM

## 2022-02-22 DIAGNOSIS — E66.9 OBESITY, UNSPECIFIED: ICD-10-CM

## 2022-02-22 PROCEDURE — 99212 OFFICE O/P EST SF 10 MIN: CPT

## 2022-02-22 NOTE — HISTORY OF PRESENT ILLNESS
[de-identified] : Patient had surgery approximately 3 years ago. \par He denies heartburn/reflux/vomiting and food intolerance.\par He is still not using his CPAP machine but did not follow up with pulmonary. I gave him the contact information for pulmonary and recommend that he schedule a follow up appointment.\par  \par

## 2022-02-22 NOTE — PLAN
[FreeTextEntry1] : Plan: Add exercise as discussed.\par          Follow up with pulmonary.\par          RTO for annual visit.\par          Call with concerns.

## 2022-03-22 ENCOUNTER — APPOINTMENT (OUTPATIENT)
Dept: SURGERY | Facility: CLINIC | Age: 32
End: 2022-03-22

## 2025-04-24 NOTE — H&P PST ADULT - SOURCE OF INFORMATION, PROFILE
Subjective   Yohan Mccain is a 70 y.o. male who presents for Establish Care.  Here to get established - previously a patient of Dr Gould. He has a h/o HTN, high chol, CKD, GERD, elevated glucose, Raynauds, COPD, BPH.  He states that he is doing pretty well right now.      Quit smoking 2018 - smoked 3 ppd for 60 years.      He takes sildenafil for both COPD and raynauds and it has been very helpful.    He has been stable on his current medications for quite some time.              Objective   Visit Vitals  /70   Pulse 83      Physical Exam  Vitals reviewed.   Constitutional:       General: He is not in acute distress.  Cardiovascular:      Rate and Rhythm: Normal rate and regular rhythm.      Heart sounds: No murmur heard.  Pulmonary:      Effort: Pulmonary effort is normal. No respiratory distress.      Breath sounds: Normal breath sounds.   Skin:     General: Skin is warm and dry.   Neurological:      General: No focal deficit present.      Mental Status: He is alert. Mental status is at baseline.         Assessment/Plan   Problem List Items Addressed This Visit       Benign hypertension with CKD (chronic kidney disease) stage III (Multi)    Relevant Orders    CBC and Auto Differential    Comprehensive Metabolic Panel    Hemoglobin A1C    Lipid Panel    TSH with reflex to Free T4 if abnormal    Benign prostatic hyperplasia with urinary obstruction and other lower urinary tract symptoms    Relevant Medications    prazosin (Minipress) 1 mg capsule    Carotid bruit    Relevant Medications    cilostazol (Pletal) 100 mg tablet    GERD (gastroesophageal reflux disease)    Relevant Medications    omeprazole (PriLOSEC) 40 mg DR capsule    Other Relevant Orders    CBC and Auto Differential    Comprehensive Metabolic Panel    Hemoglobin A1C    Lipid Panel    TSH with reflex to Free T4 if abnormal    HTN (hypertension) - Primary    Relevant Medications    losartan (Cozaar) 50 mg tablet    Other Relevant Orders     CBC and Auto Differential    Comprehensive Metabolic Panel    Hemoglobin A1C    Lipid Panel    TSH with reflex to Free T4 if abnormal    Hyperlipidemia    Relevant Medications    atorvastatin (Lipitor) 10 mg tablet    Other Relevant Orders    CBC and Auto Differential    Comprehensive Metabolic Panel    Hemoglobin A1C    Lipid Panel    TSH with reflex to Free T4 if abnormal    Raynaud's disease    Relevant Orders    CBC and Auto Differential    Comprehensive Metabolic Panel    Hemoglobin A1C    Lipid Panel    TSH with reflex to Free T4 if abnormal    Chronic obstructive pulmonary disease, unspecified COPD type (Multi)    Relevant Orders    CBC and Auto Differential    Comprehensive Metabolic Panel    Hemoglobin A1C    Lipid Panel    TSH with reflex to Free T4 if abnormal     Other Visit Diagnoses         Screening PSA (prostate specific antigen)        Relevant Orders    Prostate Specific Antigen      Elevated glucose        Relevant Orders    CBC and Auto Differential    Comprehensive Metabolic Panel    Hemoglobin A1C    Lipid Panel    TSH with reflex to Free T4 if abnormal               Cici Douglas MD    chart(s)